# Patient Record
Sex: FEMALE | Race: WHITE | NOT HISPANIC OR LATINO | Employment: UNEMPLOYED | URBAN - NONMETROPOLITAN AREA
[De-identification: names, ages, dates, MRNs, and addresses within clinical notes are randomized per-mention and may not be internally consistent; named-entity substitution may affect disease eponyms.]

---

## 2017-01-03 ENCOUNTER — APPOINTMENT (OUTPATIENT)
Dept: PHYSICAL THERAPY | Facility: CLINIC | Age: 22
End: 2017-01-03
Payer: COMMERCIAL

## 2017-01-03 PROCEDURE — 97112 NEUROMUSCULAR REEDUCATION: CPT

## 2017-01-03 PROCEDURE — 97116 GAIT TRAINING THERAPY: CPT

## 2017-01-03 PROCEDURE — 97110 THERAPEUTIC EXERCISES: CPT

## 2017-01-03 PROCEDURE — 97140 MANUAL THERAPY 1/> REGIONS: CPT

## 2017-01-10 ENCOUNTER — APPOINTMENT (OUTPATIENT)
Dept: PHYSICAL THERAPY | Facility: CLINIC | Age: 22
End: 2017-01-10
Payer: COMMERCIAL

## 2017-01-10 PROCEDURE — 97116 GAIT TRAINING THERAPY: CPT

## 2017-01-10 PROCEDURE — 97140 MANUAL THERAPY 1/> REGIONS: CPT

## 2017-01-10 PROCEDURE — 97110 THERAPEUTIC EXERCISES: CPT

## 2017-01-11 ENCOUNTER — ALLSCRIPTS OFFICE VISIT (OUTPATIENT)
Dept: OTHER | Facility: OTHER | Age: 22
End: 2017-01-11

## 2017-01-11 DIAGNOSIS — M25.572 PAIN IN LEFT ANKLE: ICD-10-CM

## 2017-01-11 DIAGNOSIS — R63.4 ABNORMAL WEIGHT LOSS: ICD-10-CM

## 2017-01-11 DIAGNOSIS — D64.9 ANEMIA: ICD-10-CM

## 2017-01-11 DIAGNOSIS — D50.9 IRON DEFICIENCY ANEMIA: ICD-10-CM

## 2017-01-11 DIAGNOSIS — M25.473 EFFUSION OF ANKLE: ICD-10-CM

## 2017-01-11 DIAGNOSIS — R26.2 DIFFICULTY IN WALKING, NOT ELSEWHERE CLASSIFIED: ICD-10-CM

## 2017-01-11 DIAGNOSIS — M06.9 RHEUMATOID ARTHRITIS (HCC): ICD-10-CM

## 2017-01-16 ENCOUNTER — HOSPITAL ENCOUNTER (OUTPATIENT)
Dept: RADIOLOGY | Facility: HOSPITAL | Age: 22
Discharge: HOME | End: 2017-01-16
Payer: COMMERCIAL

## 2017-01-16 ENCOUNTER — TRANSCRIBE ORDERS (OUTPATIENT)
Dept: ADMINISTRATIVE | Facility: HOSPITAL | Age: 22
End: 2017-01-16

## 2017-01-16 DIAGNOSIS — M25.572 PAIN IN LEFT ANKLE: ICD-10-CM

## 2017-01-16 DIAGNOSIS — R26.2 DIFFICULTY IN WALKING, NOT ELSEWHERE CLASSIFIED: ICD-10-CM

## 2017-01-16 DIAGNOSIS — M06.9 RHEUMATOID ARTHRITIS (HCC): ICD-10-CM

## 2017-01-16 DIAGNOSIS — M25.473 EFFUSION OF ANKLE: ICD-10-CM

## 2017-01-16 PROCEDURE — 73610 X-RAY EXAM OF ANKLE: CPT

## 2017-01-17 ENCOUNTER — GENERIC CONVERSION - ENCOUNTER (OUTPATIENT)
Dept: OTHER | Facility: OTHER | Age: 22
End: 2017-01-17

## 2017-01-18 ENCOUNTER — GENERIC CONVERSION - ENCOUNTER (OUTPATIENT)
Dept: OTHER | Facility: OTHER | Age: 22
End: 2017-01-18

## 2017-01-25 ENCOUNTER — GENERIC CONVERSION - ENCOUNTER (OUTPATIENT)
Dept: OTHER | Facility: OTHER | Age: 22
End: 2017-01-25

## 2017-01-26 ENCOUNTER — APPOINTMENT (OUTPATIENT)
Dept: PHYSICAL THERAPY | Facility: CLINIC | Age: 22
End: 2017-01-26
Payer: COMMERCIAL

## 2017-01-31 ENCOUNTER — APPOINTMENT (OUTPATIENT)
Dept: PHYSICAL THERAPY | Facility: CLINIC | Age: 22
End: 2017-01-31
Payer: COMMERCIAL

## 2017-01-31 PROCEDURE — 97140 MANUAL THERAPY 1/> REGIONS: CPT

## 2017-01-31 PROCEDURE — 97110 THERAPEUTIC EXERCISES: CPT

## 2017-01-31 PROCEDURE — 97112 NEUROMUSCULAR REEDUCATION: CPT

## 2017-02-02 ENCOUNTER — APPOINTMENT (OUTPATIENT)
Dept: PHYSICAL THERAPY | Facility: CLINIC | Age: 22
End: 2017-02-02
Payer: COMMERCIAL

## 2017-02-02 PROCEDURE — 97140 MANUAL THERAPY 1/> REGIONS: CPT

## 2017-02-02 PROCEDURE — 97112 NEUROMUSCULAR REEDUCATION: CPT

## 2017-02-06 ENCOUNTER — APPOINTMENT (OUTPATIENT)
Dept: PHYSICAL THERAPY | Facility: CLINIC | Age: 22
End: 2017-02-06
Payer: COMMERCIAL

## 2017-02-06 PROCEDURE — 97112 NEUROMUSCULAR REEDUCATION: CPT

## 2017-02-06 PROCEDURE — 97116 GAIT TRAINING THERAPY: CPT

## 2017-02-06 PROCEDURE — 97110 THERAPEUTIC EXERCISES: CPT

## 2017-02-06 PROCEDURE — 97140 MANUAL THERAPY 1/> REGIONS: CPT

## 2017-02-07 ENCOUNTER — APPOINTMENT (OUTPATIENT)
Dept: PHYSICAL THERAPY | Facility: CLINIC | Age: 22
End: 2017-02-07
Payer: COMMERCIAL

## 2017-02-13 ENCOUNTER — APPOINTMENT (OUTPATIENT)
Dept: PHYSICAL THERAPY | Facility: CLINIC | Age: 22
End: 2017-02-13
Payer: COMMERCIAL

## 2017-02-13 ENCOUNTER — GENERIC CONVERSION - ENCOUNTER (OUTPATIENT)
Dept: OTHER | Facility: OTHER | Age: 22
End: 2017-02-13

## 2017-02-13 PROCEDURE — 97140 MANUAL THERAPY 1/> REGIONS: CPT

## 2017-02-13 PROCEDURE — 97112 NEUROMUSCULAR REEDUCATION: CPT

## 2017-02-13 PROCEDURE — 97116 GAIT TRAINING THERAPY: CPT

## 2017-02-14 ENCOUNTER — TRANSCRIBE ORDERS (OUTPATIENT)
Dept: URGENT CARE | Facility: CLINIC | Age: 22
End: 2017-02-14

## 2017-02-14 ENCOUNTER — LAB (OUTPATIENT)
Dept: LAB | Facility: CLINIC | Age: 22
End: 2017-02-14
Payer: COMMERCIAL

## 2017-02-14 ENCOUNTER — APPOINTMENT (OUTPATIENT)
Dept: PHYSICAL THERAPY | Facility: CLINIC | Age: 22
End: 2017-02-14
Payer: COMMERCIAL

## 2017-02-14 DIAGNOSIS — D64.9 ANEMIA, UNSPECIFIED: ICD-10-CM

## 2017-02-14 DIAGNOSIS — R63.4 LOSS OF WEIGHT: ICD-10-CM

## 2017-02-14 DIAGNOSIS — M06.9 RHEUMATOID ARTHRITIS, INVOLVING UNSPECIFIED SITE, UNSPECIFIED RHEUMATOID FACTOR PRESENCE: ICD-10-CM

## 2017-02-14 DIAGNOSIS — M25.476 EFFUSION OF ANKLE AND FOOT JOINT: ICD-10-CM

## 2017-02-14 DIAGNOSIS — R26.2 CANNOT WALK: ICD-10-CM

## 2017-02-14 DIAGNOSIS — R63.4 LOSS OF WEIGHT: Primary | ICD-10-CM

## 2017-02-14 DIAGNOSIS — M25.473 EFFUSION OF ANKLE AND FOOT JOINT: ICD-10-CM

## 2017-02-14 LAB
ALBUMIN SERPL BCP-MCNC: 2.7 G/DL (ref 3.5–5)
ALP SERPL-CCNC: 241 U/L (ref 46–116)
ALT SERPL W P-5'-P-CCNC: 34 U/L (ref 12–78)
ANION GAP SERPL CALCULATED.3IONS-SCNC: 10 MMOL/L (ref 4–13)
AST SERPL W P-5'-P-CCNC: 18 U/L (ref 5–45)
BASOPHILS # BLD AUTO: 0.02 THOUSANDS/ΜL (ref 0–0.1)
BASOPHILS NFR BLD AUTO: 0 % (ref 0–1)
BILIRUB SERPL-MCNC: 0.5 MG/DL (ref 0.2–1)
BUN SERPL-MCNC: 8 MG/DL (ref 5–25)
CALCIUM SERPL-MCNC: 9.2 MG/DL (ref 8.3–10.1)
CHLORIDE SERPL-SCNC: 105 MMOL/L (ref 100–108)
CO2 SERPL-SCNC: 24 MMOL/L (ref 21–32)
CREAT SERPL-MCNC: 0.47 MG/DL (ref 0.6–1.3)
EOSINOPHIL # BLD AUTO: 0.05 THOUSAND/ΜL (ref 0–0.61)
EOSINOPHIL NFR BLD AUTO: 1 % (ref 0–6)
ERYTHROCYTE [DISTWIDTH] IN BLOOD BY AUTOMATED COUNT: 17.8 % (ref 11.6–15.1)
ERYTHROCYTE [SEDIMENTATION RATE] IN BLOOD: 82 MM/HOUR (ref 0–20)
FERRITIN SERPL-MCNC: 24 NG/ML (ref 8–388)
GFR SERPL CREATININE-BSD FRML MDRD: >60 ML/MIN/1.73SQ M
GLUCOSE SERPL-MCNC: 83 MG/DL (ref 65–140)
HCT VFR BLD AUTO: 33.8 % (ref 34.8–46.1)
HGB BLD-MCNC: 9.7 G/DL (ref 11.5–15.4)
LYMPHOCYTES # BLD AUTO: 2.26 THOUSANDS/ΜL (ref 0.6–4.47)
LYMPHOCYTES NFR BLD AUTO: 38 % (ref 14–44)
MCH RBC QN AUTO: 19.8 PG (ref 26.8–34.3)
MCHC RBC AUTO-ENTMCNC: 28.7 G/DL (ref 31.4–37.4)
MCV RBC AUTO: 69 FL (ref 82–98)
MONOCYTES # BLD AUTO: 0.47 THOUSAND/ΜL (ref 0.17–1.22)
MONOCYTES NFR BLD AUTO: 8 % (ref 4–12)
NEUTROPHILS # BLD AUTO: 3.18 THOUSANDS/ΜL (ref 1.85–7.62)
NEUTS SEG NFR BLD AUTO: 53 % (ref 43–75)
NRBC BLD AUTO-RTO: 0 /100 WBCS
PLATELET # BLD AUTO: 796 THOUSANDS/UL (ref 149–390)
PMV BLD AUTO: 8.6 FL (ref 8.9–12.7)
POTASSIUM SERPL-SCNC: 3.9 MMOL/L (ref 3.5–5.3)
PROT SERPL-MCNC: 8.1 G/DL (ref 6.4–8.2)
RBC # BLD AUTO: 4.89 MILLION/UL (ref 3.81–5.12)
SODIUM SERPL-SCNC: 139 MMOL/L (ref 136–145)
TIBC SERPL-MCNC: 329 UG/DL (ref 250–450)
TSH SERPL DL<=0.05 MIU/L-ACNC: 1.67 UIU/ML (ref 0.36–3.74)
WBC # BLD AUTO: 5.99 THOUSAND/UL (ref 4.31–10.16)

## 2017-02-14 PROCEDURE — 83540 ASSAY OF IRON: CPT

## 2017-02-14 PROCEDURE — 82728 ASSAY OF FERRITIN: CPT

## 2017-02-14 PROCEDURE — 83550 IRON BINDING TEST: CPT

## 2017-02-14 PROCEDURE — 84443 ASSAY THYROID STIM HORMONE: CPT

## 2017-02-14 PROCEDURE — 80053 COMPREHEN METABOLIC PANEL: CPT

## 2017-02-14 PROCEDURE — 36415 COLL VENOUS BLD VENIPUNCTURE: CPT

## 2017-02-14 PROCEDURE — 85025 COMPLETE CBC W/AUTO DIFF WBC: CPT

## 2017-02-14 PROCEDURE — 85652 RBC SED RATE AUTOMATED: CPT

## 2017-02-15 DIAGNOSIS — D63.8 ANEMIA IN OTHER CHRONIC DISEASES CLASSIFIED ELSEWHERE: ICD-10-CM

## 2017-02-15 DIAGNOSIS — D50.9 IRON DEFICIENCY ANEMIA: ICD-10-CM

## 2017-02-15 DIAGNOSIS — D64.9 ANEMIA: ICD-10-CM

## 2017-02-15 LAB — IRON SERPL-MCNC: 20 UG/DL (ref 50–170)

## 2017-02-16 ENCOUNTER — APPOINTMENT (OUTPATIENT)
Dept: PHYSICAL THERAPY | Facility: CLINIC | Age: 22
End: 2017-02-16
Payer: COMMERCIAL

## 2017-02-16 PROCEDURE — 97140 MANUAL THERAPY 1/> REGIONS: CPT

## 2017-02-16 PROCEDURE — 97112 NEUROMUSCULAR REEDUCATION: CPT

## 2017-02-16 PROCEDURE — 97116 GAIT TRAINING THERAPY: CPT

## 2017-02-20 ENCOUNTER — APPOINTMENT (OUTPATIENT)
Dept: PHYSICAL THERAPY | Facility: CLINIC | Age: 22
End: 2017-02-20
Payer: COMMERCIAL

## 2017-02-20 PROCEDURE — 97140 MANUAL THERAPY 1/> REGIONS: CPT

## 2017-02-20 PROCEDURE — 97116 GAIT TRAINING THERAPY: CPT

## 2017-02-20 PROCEDURE — 97112 NEUROMUSCULAR REEDUCATION: CPT

## 2017-02-22 ENCOUNTER — APPOINTMENT (OUTPATIENT)
Dept: PHYSICAL THERAPY | Facility: CLINIC | Age: 22
End: 2017-02-22
Payer: COMMERCIAL

## 2017-02-22 ENCOUNTER — GENERIC CONVERSION - ENCOUNTER (OUTPATIENT)
Dept: OTHER | Facility: OTHER | Age: 22
End: 2017-02-22

## 2017-02-23 ENCOUNTER — APPOINTMENT (OUTPATIENT)
Dept: PHYSICAL THERAPY | Facility: CLINIC | Age: 22
End: 2017-02-23
Payer: COMMERCIAL

## 2017-02-23 PROCEDURE — 97116 GAIT TRAINING THERAPY: CPT

## 2017-02-23 PROCEDURE — 97140 MANUAL THERAPY 1/> REGIONS: CPT

## 2017-02-23 PROCEDURE — 97112 NEUROMUSCULAR REEDUCATION: CPT

## 2017-02-24 ENCOUNTER — GENERIC CONVERSION - ENCOUNTER (OUTPATIENT)
Dept: OTHER | Facility: OTHER | Age: 22
End: 2017-02-24

## 2017-02-27 ENCOUNTER — APPOINTMENT (OUTPATIENT)
Dept: PHYSICAL THERAPY | Facility: CLINIC | Age: 22
End: 2017-02-27
Payer: COMMERCIAL

## 2017-02-27 PROCEDURE — 97760 ORTHOTIC MGMT&TRAING 1ST ENC: CPT

## 2017-02-27 PROCEDURE — 97140 MANUAL THERAPY 1/> REGIONS: CPT

## 2017-03-01 ENCOUNTER — APPOINTMENT (OUTPATIENT)
Dept: PHYSICAL THERAPY | Facility: CLINIC | Age: 22
End: 2017-03-01
Payer: COMMERCIAL

## 2017-03-01 RX ORDER — SODIUM CHLORIDE 9 MG/ML
20 INJECTION, SOLUTION INTRAVENOUS CONTINUOUS
Status: DISPENSED | OUTPATIENT
Start: 2017-03-02 | End: 2017-03-02

## 2017-03-02 ENCOUNTER — HOSPITAL ENCOUNTER (OUTPATIENT)
Dept: INFUSION CENTER | Facility: HOSPITAL | Age: 22
Discharge: HOME | End: 2017-03-02
Payer: COMMERCIAL

## 2017-03-02 VITALS
RESPIRATION RATE: 18 BRPM | SYSTOLIC BLOOD PRESSURE: 119 MMHG | TEMPERATURE: 99.7 F | DIASTOLIC BLOOD PRESSURE: 65 MMHG | HEART RATE: 85 BPM

## 2017-03-02 PROCEDURE — 96365 THER/PROPH/DIAG IV INF INIT: CPT

## 2017-03-02 PROCEDURE — 96366 THER/PROPH/DIAG IV INF ADDON: CPT

## 2017-03-02 RX ADMIN — IRON SUCROSE 300 MG: 20 INJECTION, SOLUTION INTRAVENOUS at 12:08

## 2017-03-02 RX ADMIN — SODIUM CHLORIDE 20 ML/HR: 0.9 INJECTION, SOLUTION INTRAVENOUS at 12:05

## 2017-03-02 NOTE — PROGRESS NOTES
Pt tolerated venofer well  Observed x 30 minutes post infusion  Temp 99 7  Pt discharged to home in satisfactory condition via wheelchair accompanied by her mother  Next appointment 3/16/17 as patient will be away next week

## 2017-03-02 NOTE — PROGRESS NOTES
Pt arrived on unit at 1145  Temp 100 5  Denies any coughing, sore throat, fever urinary frequency or urgency  Mother stated she has a rash on both her legs  Pt has 1/4 inch size reddened areas on both lower legs up to her knees  Rash on left legs has some areas that have a white raised center from patient scratching  Mother states rash is from lotion they put onher legs and that the rash is resolving  Frederick Bland RN notified of temp and rash  Okay to proceed with venofer

## 2017-03-02 NOTE — PLAN OF CARE
Problem: Knowledge Deficit  Goal: Patient/family/caregiver demonstrates understanding of disease process, treatment plan, medications, and discharge instructions  Complete learning assessment and assess knowledge base  Interventions:  - Provide teaching at level of understanding  - Provide teaching via preferred learning methods   Outcome: Progressing  Mother is patient's caregiver  Side effects for Venofer reviewed with mother

## 2017-03-02 NOTE — PLAN OF CARE
Problem: Potential for Falls  Goal: Patient will remain free of falls  INTERVENTIONS:  - Assess patient frequently for physical needs  - Identify cognitive and physical deficits and behaviors that affect risk of falls    - [place] fall precautions as indicated by assessment   - Educate patient/family on patient safety including physical limitations  - Instruct patient to call for assistance with activity based on assessment  - Modify environment to reduce risk of injury  - Consider OT/PT consult to assist with strengthening/mobility   Outcome: Progressing

## 2017-03-02 NOTE — PLAN OF CARE
Problem: Nutrition/Hydration-ADULT  Goal: Nutrient/Hydration intake appropriate for improving, restoring or maintaining nutritional needs  Monitor and assess patients nutrition/hydration status for malnutrition (ex- brittle hair, bruises, dry skin, pale skin and conjunctiva, muscle wasting, smooth red tongue, and disorientation)  Collaborate with interdisciplinary team and initiate plan and interventions as ordered  Monitor patients weight and dietary intake as ordered or per policy  Utilize nutrition screening tool and intervene per policy  Determine patients food preferences and provide high-protein, high-caloric foods as appropriate       INTERVENTIONS:  - Monitor oral intake, urinary output, labs, and treatment plans  - Assess nutrition and hydration status and recommend course of action  - Evaluate amount of meals eaten  - Assist patient with eating if necessary   - Allow adequate time for meals  - Recommend/ encourage appropriate diets, oral nutritional supplements, and vitamin/mineral supplements  - Order, calculate, and assess calorie counts as needed  - Recommend, monitor, and adjust tube feedings and TPN/PPN based on assessed needs  - Assess need for intravenous fluids  - Provide specific nutrition/hydration education as appropriate  - Include patient/family/caregiver in decisions related to nutrition   Outcome: Progressing

## 2017-03-06 ENCOUNTER — APPOINTMENT (OUTPATIENT)
Dept: PHYSICAL THERAPY | Facility: CLINIC | Age: 22
End: 2017-03-06
Payer: COMMERCIAL

## 2017-03-06 PROCEDURE — 97116 GAIT TRAINING THERAPY: CPT

## 2017-03-06 PROCEDURE — 97140 MANUAL THERAPY 1/> REGIONS: CPT

## 2017-03-06 PROCEDURE — 97110 THERAPEUTIC EXERCISES: CPT

## 2017-03-10 ENCOUNTER — GENERIC CONVERSION - ENCOUNTER (OUTPATIENT)
Dept: OTHER | Facility: OTHER | Age: 22
End: 2017-03-10

## 2017-03-13 ENCOUNTER — APPOINTMENT (OUTPATIENT)
Dept: PHYSICAL THERAPY | Facility: CLINIC | Age: 22
End: 2017-03-13
Payer: COMMERCIAL

## 2017-03-15 RX ORDER — SODIUM CHLORIDE 9 MG/ML
20 INJECTION, SOLUTION INTRAVENOUS CONTINUOUS
Status: DISCONTINUED | OUTPATIENT
Start: 2017-03-16 | End: 2017-03-16

## 2017-03-16 ENCOUNTER — APPOINTMENT (OUTPATIENT)
Dept: PHYSICAL THERAPY | Facility: CLINIC | Age: 22
End: 2017-03-16
Payer: COMMERCIAL

## 2017-03-16 ENCOUNTER — HOSPITAL ENCOUNTER (OUTPATIENT)
Dept: INFUSION CENTER | Facility: HOSPITAL | Age: 22
Discharge: HOME | End: 2017-03-16
Payer: COMMERCIAL

## 2017-03-20 ENCOUNTER — ALLSCRIPTS OFFICE VISIT (OUTPATIENT)
Dept: OTHER | Facility: OTHER | Age: 22
End: 2017-03-20

## 2017-03-20 DIAGNOSIS — R06.02 SHORTNESS OF BREATH: ICD-10-CM

## 2017-03-20 DIAGNOSIS — R53.82 CHRONIC FATIGUE: ICD-10-CM

## 2017-03-21 ENCOUNTER — APPOINTMENT (OUTPATIENT)
Dept: PHYSICAL THERAPY | Facility: CLINIC | Age: 22
End: 2017-03-21
Payer: COMMERCIAL

## 2017-03-21 PROCEDURE — 97140 MANUAL THERAPY 1/> REGIONS: CPT

## 2017-03-21 PROCEDURE — 97116 GAIT TRAINING THERAPY: CPT

## 2017-03-21 PROCEDURE — 97112 NEUROMUSCULAR REEDUCATION: CPT

## 2017-03-22 RX ORDER — SODIUM CHLORIDE 9 MG/ML
20 INJECTION, SOLUTION INTRAVENOUS CONTINUOUS
Status: DISPENSED | OUTPATIENT
Start: 2017-03-23 | End: 2017-03-23

## 2017-03-23 ENCOUNTER — HOSPITAL ENCOUNTER (OUTPATIENT)
Dept: INFUSION CENTER | Facility: HOSPITAL | Age: 22
Discharge: HOME | End: 2017-03-23
Payer: COMMERCIAL

## 2017-03-23 VITALS
RESPIRATION RATE: 18 BRPM | SYSTOLIC BLOOD PRESSURE: 113 MMHG | DIASTOLIC BLOOD PRESSURE: 65 MMHG | HEART RATE: 96 BPM | TEMPERATURE: 99.2 F

## 2017-03-23 PROCEDURE — 96365 THER/PROPH/DIAG IV INF INIT: CPT

## 2017-03-23 RX ADMIN — IRON SUCROSE 300 MG: 20 INJECTION, SOLUTION INTRAVENOUS at 14:30

## 2017-03-23 RX ADMIN — SODIUM CHLORIDE 20 ML/HR: 0.9 INJECTION, SOLUTION INTRAVENOUS at 14:15

## 2017-03-23 NOTE — PROGRESS NOTES
Pt tolerated treatment well  Discharged to home in satisfactory condition via wheelchair accompanied by mother  Next appointment 3/30/17

## 2017-03-23 NOTE — PROGRESS NOTES
Pt arrived on unit at 1310  Mother states she noticed a big difference in her daughters energy level after her last Venofer treatment  IV attempted x 2 by Blake Burks RN were unsuccessful  IV started on 3 rd attempt

## 2017-03-29 RX ORDER — SODIUM CHLORIDE 9 MG/ML
20 INJECTION, SOLUTION INTRAVENOUS CONTINUOUS
Status: DISCONTINUED | OUTPATIENT
Start: 2017-03-30 | End: 2017-03-30

## 2017-03-30 ENCOUNTER — HOSPITAL ENCOUNTER (OUTPATIENT)
Dept: INFUSION CENTER | Facility: HOSPITAL | Age: 22
Discharge: HOME | End: 2017-03-30
Payer: COMMERCIAL

## 2017-03-31 ENCOUNTER — HOSPITAL ENCOUNTER (OUTPATIENT)
Dept: NON INVASIVE DIAGNOSTICS | Facility: HOSPITAL | Age: 22
Discharge: HOME | End: 2017-03-31
Attending: INTERNAL MEDICINE
Payer: COMMERCIAL

## 2017-03-31 DIAGNOSIS — R60.9 EDEMA: ICD-10-CM

## 2017-03-31 PROCEDURE — 93306 TTE W/DOPPLER COMPLETE: CPT

## 2017-04-03 ENCOUNTER — TRANSCRIBE ORDERS (OUTPATIENT)
Dept: ADMINISTRATIVE | Facility: HOSPITAL | Age: 22
End: 2017-04-03

## 2017-04-03 ENCOUNTER — HOSPITAL ENCOUNTER (OUTPATIENT)
Dept: RADIOLOGY | Facility: HOSPITAL | Age: 22
Discharge: HOME | End: 2017-04-03
Attending: INTERNAL MEDICINE
Payer: COMMERCIAL

## 2017-04-03 DIAGNOSIS — R06.02 SHORTNESS OF BREATH: ICD-10-CM

## 2017-04-03 PROCEDURE — 71020 HB CHEST X-RAY 2VW FRONTAL&LATL: CPT

## 2017-04-11 ENCOUNTER — HOSPITAL ENCOUNTER (OUTPATIENT)
Dept: INFUSION CENTER | Facility: HOSPITAL | Age: 22
Discharge: HOME | End: 2017-04-11
Payer: COMMERCIAL

## 2017-04-11 VITALS
SYSTOLIC BLOOD PRESSURE: 103 MMHG | RESPIRATION RATE: 18 BRPM | TEMPERATURE: 99.6 F | DIASTOLIC BLOOD PRESSURE: 56 MMHG | HEART RATE: 80 BPM

## 2017-04-11 PROCEDURE — 96365 THER/PROPH/DIAG IV INF INIT: CPT

## 2017-04-11 PROCEDURE — 96366 THER/PROPH/DIAG IV INF ADDON: CPT

## 2017-04-11 RX ORDER — SODIUM CHLORIDE 9 MG/ML
20 INJECTION, SOLUTION INTRAVENOUS CONTINUOUS
Status: DISPENSED | OUTPATIENT
Start: 2017-04-11 | End: 2017-04-11

## 2017-04-11 RX ADMIN — IRON SUCROSE 300 MG: 20 INJECTION, SOLUTION INTRAVENOUS at 13:37

## 2017-04-11 RX ADMIN — SODIUM CHLORIDE 20 ML/HR: 0.9 INJECTION, SOLUTION INTRAVENOUS at 13:20

## 2017-04-11 NOTE — PLAN OF CARE
Problem: Potential for Falls  Goal: Patient will remain free of falls  INTERVENTIONS:  - Assess patient frequently for physical needs  - Identify cognitive and physical deficits and behaviors that affect risk of falls    - [place] fall precautions as indicated by assessment   - Educate patient/family on patient safety including physical limitations  - Instruct patient to call for assistance with activity based on assessment  - Modify environment to reduce risk of injury  - Consider OT/PT consult to assist with strengthening/mobility   Outcome: Progressing

## 2017-04-11 NOTE — PROGRESS NOTES
Pt tolerated treatment well  D/C to home accompanied by mother in satisfactory condition  No further appointments needed at this time

## 2017-04-17 ENCOUNTER — APPOINTMENT (OUTPATIENT)
Dept: PHYSICAL THERAPY | Facility: CLINIC | Age: 22
End: 2017-04-17
Payer: COMMERCIAL

## 2017-04-17 PROCEDURE — 97112 NEUROMUSCULAR REEDUCATION: CPT

## 2017-04-17 PROCEDURE — 97140 MANUAL THERAPY 1/> REGIONS: CPT

## 2017-04-17 PROCEDURE — 97116 GAIT TRAINING THERAPY: CPT

## 2017-04-18 ENCOUNTER — ALLSCRIPTS OFFICE VISIT (OUTPATIENT)
Dept: OTHER | Facility: OTHER | Age: 22
End: 2017-04-18

## 2017-04-20 ENCOUNTER — APPOINTMENT (OUTPATIENT)
Dept: PHYSICAL THERAPY | Facility: CLINIC | Age: 22
End: 2017-04-20
Payer: COMMERCIAL

## 2017-04-20 PROCEDURE — 97112 NEUROMUSCULAR REEDUCATION: CPT

## 2017-04-20 PROCEDURE — 97140 MANUAL THERAPY 1/> REGIONS: CPT

## 2017-04-20 PROCEDURE — 97110 THERAPEUTIC EXERCISES: CPT

## 2017-04-24 ENCOUNTER — ALLSCRIPTS OFFICE VISIT (OUTPATIENT)
Dept: OTHER | Facility: OTHER | Age: 22
End: 2017-04-24

## 2017-04-25 ENCOUNTER — APPOINTMENT (OUTPATIENT)
Dept: PHYSICAL THERAPY | Facility: CLINIC | Age: 22
End: 2017-04-25
Payer: COMMERCIAL

## 2017-04-25 PROCEDURE — 97116 GAIT TRAINING THERAPY: CPT

## 2017-04-25 PROCEDURE — 97140 MANUAL THERAPY 1/> REGIONS: CPT

## 2017-04-25 PROCEDURE — 97112 NEUROMUSCULAR REEDUCATION: CPT

## 2017-05-01 ENCOUNTER — ALLSCRIPTS OFFICE VISIT (OUTPATIENT)
Dept: OTHER | Facility: OTHER | Age: 22
End: 2017-05-01

## 2017-05-04 ENCOUNTER — APPOINTMENT (OUTPATIENT)
Dept: PHYSICAL THERAPY | Facility: CLINIC | Age: 22
End: 2017-05-04
Payer: COMMERCIAL

## 2017-05-04 PROCEDURE — 97110 THERAPEUTIC EXERCISES: CPT

## 2017-05-04 PROCEDURE — 97116 GAIT TRAINING THERAPY: CPT

## 2017-05-04 PROCEDURE — 97112 NEUROMUSCULAR REEDUCATION: CPT

## 2017-05-04 PROCEDURE — 97140 MANUAL THERAPY 1/> REGIONS: CPT

## 2017-05-08 ENCOUNTER — APPOINTMENT (OUTPATIENT)
Dept: PHYSICAL THERAPY | Facility: CLINIC | Age: 22
End: 2017-05-08
Payer: COMMERCIAL

## 2017-05-08 PROCEDURE — 97110 THERAPEUTIC EXERCISES: CPT

## 2017-05-08 PROCEDURE — 97140 MANUAL THERAPY 1/> REGIONS: CPT

## 2017-05-08 PROCEDURE — 97112 NEUROMUSCULAR REEDUCATION: CPT

## 2017-05-09 ENCOUNTER — TRANSCRIBE ORDERS (OUTPATIENT)
Dept: LAB | Facility: CLINIC | Age: 22
End: 2017-05-09

## 2017-05-09 ENCOUNTER — TRANSCRIBE ORDERS (OUTPATIENT)
Dept: URGENT CARE | Facility: CLINIC | Age: 22
End: 2017-05-09

## 2017-05-09 ENCOUNTER — APPOINTMENT (OUTPATIENT)
Dept: LAB | Facility: CLINIC | Age: 22
End: 2017-05-09
Payer: COMMERCIAL

## 2017-05-09 DIAGNOSIS — D64.9 ANEMIA, UNSPECIFIED: ICD-10-CM

## 2017-05-09 DIAGNOSIS — M06.9 RHEUMATOID ARTHRITIS, INVOLVING UNSPECIFIED SITE, UNSPECIFIED RHEUMATOID FACTOR PRESENCE: ICD-10-CM

## 2017-05-09 DIAGNOSIS — M13.0 UNSPECIFIED POLYARTHROPATHY OR POLYARTHRITIS, SITE UNSPECIFIED: ICD-10-CM

## 2017-05-09 DIAGNOSIS — R80.9 PROTEINURIA, UNSPECIFIED TYPE: Primary | ICD-10-CM

## 2017-05-09 DIAGNOSIS — Z51.81 ENCOUNTER FOR THERAPEUTIC DRUG MONITORING: ICD-10-CM

## 2017-05-09 DIAGNOSIS — D50.9 IRON DEFICIENCY ANEMIA, UNSPECIFIED: ICD-10-CM

## 2017-05-09 DIAGNOSIS — M25.473 EFFUSION OF ANKLE AND FOOT JOINT: ICD-10-CM

## 2017-05-09 DIAGNOSIS — M25.476 EFFUSION OF ANKLE AND FOOT JOINT: ICD-10-CM

## 2017-05-09 DIAGNOSIS — R26.2 CANNOT WALK: ICD-10-CM

## 2017-05-09 DIAGNOSIS — R26.2 CANNOT WALK: Primary | ICD-10-CM

## 2017-05-09 DIAGNOSIS — R80.9 PROTEINURIA, UNSPECIFIED TYPE: ICD-10-CM

## 2017-05-09 LAB
ALBUMIN SERPL BCP-MCNC: 3.6 G/DL (ref 3.5–5)
ALP SERPL-CCNC: 118 U/L (ref 46–116)
ALT SERPL W P-5'-P-CCNC: 33 U/L (ref 12–78)
ANION GAP SERPL CALCULATED.3IONS-SCNC: 7 MMOL/L (ref 4–13)
AST SERPL W P-5'-P-CCNC: 6 U/L (ref 5–45)
BASOPHILS # BLD AUTO: 0.03 THOUSANDS/ΜL (ref 0–0.1)
BASOPHILS NFR BLD AUTO: 0 % (ref 0–1)
BILIRUB SERPL-MCNC: 0.27 MG/DL (ref 0.2–1)
BILIRUB UR QL STRIP: NEGATIVE
BUN SERPL-MCNC: 12 MG/DL (ref 5–25)
CALCIUM SERPL-MCNC: 8.6 MG/DL (ref 8.3–10.1)
CHLORIDE SERPL-SCNC: 108 MMOL/L (ref 100–108)
CLARITY UR: NORMAL
CO2 SERPL-SCNC: 25 MMOL/L (ref 21–32)
COLOR UR: YELLOW
CREAT SERPL-MCNC: 0.61 MG/DL (ref 0.6–1.3)
CREAT UR-MCNC: 101 MG/DL
EOSINOPHIL # BLD AUTO: 0.01 THOUSAND/ΜL (ref 0–0.61)
EOSINOPHIL NFR BLD AUTO: 0 % (ref 0–6)
ERYTHROCYTE [DISTWIDTH] IN BLOOD BY AUTOMATED COUNT: 22.5 % (ref 11.6–15.1)
ERYTHROCYTE [SEDIMENTATION RATE] IN BLOOD: 20 MM/HOUR (ref 0–20)
FERRITIN SERPL-MCNC: 68 NG/ML (ref 8–388)
GFR SERPL CREATININE-BSD FRML MDRD: >60 ML/MIN/1.73SQ M
GLUCOSE P FAST SERPL-MCNC: 122 MG/DL (ref 65–99)
GLUCOSE UR STRIP-MCNC: NEGATIVE MG/DL
HCT VFR BLD AUTO: 48.3 % (ref 34.8–46.1)
HGB BLD-MCNC: 14.9 G/DL (ref 11.5–15.4)
HGB UR QL STRIP.AUTO: NEGATIVE
IGA SERPL-MCNC: 159 MG/DL (ref 70–400)
IGG SERPL-MCNC: 832 MG/DL (ref 700–1600)
IGM SERPL-MCNC: 165 MG/DL (ref 40–230)
KETONES UR STRIP-MCNC: NEGATIVE MG/DL
LEUKOCYTE ESTERASE UR QL STRIP: NEGATIVE
LYMPHOCYTES # BLD AUTO: 2 THOUSANDS/ΜL (ref 0.6–4.47)
LYMPHOCYTES NFR BLD AUTO: 16 % (ref 14–44)
MCH RBC QN AUTO: 25 PG (ref 26.8–34.3)
MCHC RBC AUTO-ENTMCNC: 30.8 G/DL (ref 31.4–37.4)
MCV RBC AUTO: 81 FL (ref 82–98)
MONOCYTES # BLD AUTO: 0.25 THOUSAND/ΜL (ref 0.17–1.22)
MONOCYTES NFR BLD AUTO: 2 % (ref 4–12)
NEUTROPHILS # BLD AUTO: 9.93 THOUSANDS/ΜL (ref 1.85–7.62)
NEUTS SEG NFR BLD AUTO: 82 % (ref 43–75)
NITRITE UR QL STRIP: NEGATIVE
NRBC BLD AUTO-RTO: 0 /100 WBCS
PH UR STRIP.AUTO: 8 [PH] (ref 4.5–8)
PLATELET # BLD AUTO: 216 THOUSANDS/UL (ref 149–390)
PMV BLD AUTO: 9.3 FL (ref 8.9–12.7)
POTASSIUM SERPL-SCNC: 4 MMOL/L (ref 3.5–5.3)
PROT SERPL-MCNC: 6.9 G/DL (ref 6.4–8.2)
PROT UR STRIP-MCNC: NEGATIVE MG/DL
PROT UR-MCNC: 24 MG/DL
PROT/CREAT UR: 0.24 MG/G{CREAT} (ref 0–0.1)
RBC # BLD AUTO: 5.97 MILLION/UL (ref 3.81–5.12)
SODIUM SERPL-SCNC: 140 MMOL/L (ref 136–145)
SP GR UR STRIP.AUTO: 1.02 (ref 1–1.03)
TSH SERPL DL<=0.05 MIU/L-ACNC: 0.92 UIU/ML (ref 0.36–3.74)
UROBILINOGEN UR QL STRIP.AUTO: 1 E.U./DL
WBC # BLD AUTO: 12.28 THOUSAND/UL (ref 4.31–10.16)

## 2017-05-09 PROCEDURE — 86480 TB TEST CELL IMMUN MEASURE: CPT

## 2017-05-09 PROCEDURE — 85652 RBC SED RATE AUTOMATED: CPT

## 2017-05-09 PROCEDURE — 82728 ASSAY OF FERRITIN: CPT

## 2017-05-09 PROCEDURE — 84443 ASSAY THYROID STIM HORMONE: CPT

## 2017-05-09 PROCEDURE — 85025 COMPLETE CBC W/AUTO DIFF WBC: CPT

## 2017-05-09 PROCEDURE — 82570 ASSAY OF URINE CREATININE: CPT

## 2017-05-09 PROCEDURE — 81003 URINALYSIS AUTO W/O SCOPE: CPT

## 2017-05-09 PROCEDURE — 82784 ASSAY IGA/IGD/IGG/IGM EACH: CPT

## 2017-05-09 PROCEDURE — 84156 ASSAY OF PROTEIN URINE: CPT

## 2017-05-09 PROCEDURE — 80053 COMPREHEN METABOLIC PANEL: CPT

## 2017-05-09 PROCEDURE — 36415 COLL VENOUS BLD VENIPUNCTURE: CPT

## 2017-05-11 ENCOUNTER — GENERIC CONVERSION - ENCOUNTER (OUTPATIENT)
Dept: OTHER | Facility: OTHER | Age: 22
End: 2017-05-11

## 2017-05-11 LAB
ANNOTATION COMMENT IMP: NORMAL
GAMMA INTERFERON BACKGROUND BLD IA-ACNC: 0.04 IU/ML
M TB IFN-G BLD-IMP: NEGATIVE
M TB IFN-G CD4+ BCKGRND COR BLD-ACNC: <0.01 IU/ML
M TB IFN-G CD4+ T-CELLS BLD-ACNC: 0.03 IU/ML
MITOGEN IGNF BLD-ACNC: 6.21 IU/ML
QUANTIFERON-TB GOLD IN TUBE: NORMAL
SERVICE CMNT-IMP: NORMAL

## 2017-05-18 ENCOUNTER — GENERIC CONVERSION - ENCOUNTER (OUTPATIENT)
Dept: OTHER | Facility: OTHER | Age: 22
End: 2017-05-18

## 2017-06-06 ENCOUNTER — APPOINTMENT (OUTPATIENT)
Dept: PHYSICAL THERAPY | Facility: CLINIC | Age: 22
End: 2017-06-06
Payer: COMMERCIAL

## 2017-06-06 PROCEDURE — 97116 GAIT TRAINING THERAPY: CPT

## 2017-06-06 PROCEDURE — 97110 THERAPEUTIC EXERCISES: CPT

## 2017-06-06 PROCEDURE — 97112 NEUROMUSCULAR REEDUCATION: CPT

## 2017-06-06 PROCEDURE — 97140 MANUAL THERAPY 1/> REGIONS: CPT

## 2017-06-08 ENCOUNTER — APPOINTMENT (OUTPATIENT)
Dept: PHYSICAL THERAPY | Facility: CLINIC | Age: 22
End: 2017-06-08
Payer: COMMERCIAL

## 2017-06-08 PROCEDURE — 97112 NEUROMUSCULAR REEDUCATION: CPT

## 2017-06-08 PROCEDURE — 97140 MANUAL THERAPY 1/> REGIONS: CPT

## 2017-06-08 PROCEDURE — 97110 THERAPEUTIC EXERCISES: CPT

## 2017-06-16 ENCOUNTER — ALLSCRIPTS OFFICE VISIT (OUTPATIENT)
Dept: OTHER | Facility: OTHER | Age: 22
End: 2017-06-16

## 2017-06-20 ENCOUNTER — APPOINTMENT (OUTPATIENT)
Dept: PHYSICAL THERAPY | Facility: CLINIC | Age: 22
End: 2017-06-20
Payer: COMMERCIAL

## 2017-06-20 PROCEDURE — 97110 THERAPEUTIC EXERCISES: CPT

## 2017-06-20 PROCEDURE — 97140 MANUAL THERAPY 1/> REGIONS: CPT

## 2017-06-20 PROCEDURE — 97112 NEUROMUSCULAR REEDUCATION: CPT

## 2017-06-21 ENCOUNTER — GENERIC CONVERSION - ENCOUNTER (OUTPATIENT)
Dept: OTHER | Facility: OTHER | Age: 22
End: 2017-06-21

## 2017-06-22 ENCOUNTER — APPOINTMENT (OUTPATIENT)
Dept: PHYSICAL THERAPY | Facility: CLINIC | Age: 22
End: 2017-06-22
Payer: COMMERCIAL

## 2017-06-22 PROCEDURE — 97110 THERAPEUTIC EXERCISES: CPT

## 2017-06-22 PROCEDURE — 97116 GAIT TRAINING THERAPY: CPT

## 2017-06-22 PROCEDURE — 97140 MANUAL THERAPY 1/> REGIONS: CPT

## 2017-06-22 PROCEDURE — 97112 NEUROMUSCULAR REEDUCATION: CPT

## 2017-07-06 ENCOUNTER — APPOINTMENT (OUTPATIENT)
Dept: PHYSICAL THERAPY | Facility: CLINIC | Age: 22
End: 2017-07-06
Payer: COMMERCIAL

## 2017-07-06 PROCEDURE — 97140 MANUAL THERAPY 1/> REGIONS: CPT

## 2017-07-06 PROCEDURE — 97116 GAIT TRAINING THERAPY: CPT

## 2017-07-06 PROCEDURE — 97110 THERAPEUTIC EXERCISES: CPT

## 2017-07-13 ENCOUNTER — GENERIC CONVERSION - ENCOUNTER (OUTPATIENT)
Dept: OTHER | Facility: OTHER | Age: 22
End: 2017-07-13

## 2017-07-27 ENCOUNTER — ALLSCRIPTS OFFICE VISIT (OUTPATIENT)
Dept: OTHER | Facility: OTHER | Age: 22
End: 2017-07-27

## 2017-08-10 ENCOUNTER — APPOINTMENT (OUTPATIENT)
Dept: PHYSICAL THERAPY | Facility: CLINIC | Age: 22
End: 2017-08-10
Payer: COMMERCIAL

## 2017-08-10 PROCEDURE — 97164 PT RE-EVAL EST PLAN CARE: CPT

## 2017-08-10 PROCEDURE — 97116 GAIT TRAINING THERAPY: CPT

## 2017-08-10 PROCEDURE — 97112 NEUROMUSCULAR REEDUCATION: CPT

## 2017-08-14 ENCOUNTER — GENERIC CONVERSION - ENCOUNTER (OUTPATIENT)
Dept: OTHER | Facility: OTHER | Age: 22
End: 2017-08-14

## 2017-08-15 ENCOUNTER — APPOINTMENT (OUTPATIENT)
Dept: PHYSICAL THERAPY | Facility: CLINIC | Age: 22
End: 2017-08-15
Payer: COMMERCIAL

## 2017-08-15 PROCEDURE — 97110 THERAPEUTIC EXERCISES: CPT

## 2017-08-15 PROCEDURE — 97140 MANUAL THERAPY 1/> REGIONS: CPT

## 2017-08-15 PROCEDURE — 97112 NEUROMUSCULAR REEDUCATION: CPT

## 2017-08-17 ENCOUNTER — APPOINTMENT (OUTPATIENT)
Dept: PHYSICAL THERAPY | Facility: CLINIC | Age: 22
End: 2017-08-17
Payer: COMMERCIAL

## 2017-08-29 ENCOUNTER — APPOINTMENT (OUTPATIENT)
Dept: PHYSICAL THERAPY | Facility: CLINIC | Age: 22
End: 2017-08-29
Payer: COMMERCIAL

## 2017-08-29 PROCEDURE — 97112 NEUROMUSCULAR REEDUCATION: CPT

## 2017-08-29 PROCEDURE — 97110 THERAPEUTIC EXERCISES: CPT

## 2017-08-29 PROCEDURE — 97140 MANUAL THERAPY 1/> REGIONS: CPT

## 2017-09-05 ENCOUNTER — APPOINTMENT (OUTPATIENT)
Dept: PHYSICAL THERAPY | Facility: CLINIC | Age: 22
End: 2017-09-05
Payer: COMMERCIAL

## 2017-09-05 PROCEDURE — 97116 GAIT TRAINING THERAPY: CPT

## 2017-09-05 PROCEDURE — 97112 NEUROMUSCULAR REEDUCATION: CPT

## 2017-09-05 PROCEDURE — 97140 MANUAL THERAPY 1/> REGIONS: CPT

## 2017-09-07 ENCOUNTER — APPOINTMENT (OUTPATIENT)
Dept: PHYSICAL THERAPY | Facility: CLINIC | Age: 22
End: 2017-09-07
Payer: COMMERCIAL

## 2017-09-12 ENCOUNTER — APPOINTMENT (OUTPATIENT)
Dept: PHYSICAL THERAPY | Facility: CLINIC | Age: 22
End: 2017-09-12
Payer: COMMERCIAL

## 2017-09-12 PROCEDURE — 97112 NEUROMUSCULAR REEDUCATION: CPT

## 2017-09-12 PROCEDURE — 97110 THERAPEUTIC EXERCISES: CPT

## 2017-09-12 PROCEDURE — 97140 MANUAL THERAPY 1/> REGIONS: CPT

## 2017-09-14 ENCOUNTER — APPOINTMENT (OUTPATIENT)
Dept: PHYSICAL THERAPY | Facility: CLINIC | Age: 22
End: 2017-09-14
Payer: COMMERCIAL

## 2017-09-19 ENCOUNTER — APPOINTMENT (OUTPATIENT)
Dept: PHYSICAL THERAPY | Facility: CLINIC | Age: 22
End: 2017-09-19
Payer: COMMERCIAL

## 2017-09-21 ENCOUNTER — APPOINTMENT (OUTPATIENT)
Dept: PHYSICAL THERAPY | Facility: CLINIC | Age: 22
End: 2017-09-21
Payer: COMMERCIAL

## 2017-09-21 PROCEDURE — 97112 NEUROMUSCULAR REEDUCATION: CPT

## 2017-09-21 PROCEDURE — 97140 MANUAL THERAPY 1/> REGIONS: CPT

## 2017-09-21 PROCEDURE — 97110 THERAPEUTIC EXERCISES: CPT

## 2017-09-26 ENCOUNTER — APPOINTMENT (OUTPATIENT)
Dept: PHYSICAL THERAPY | Facility: CLINIC | Age: 22
End: 2017-09-26
Payer: COMMERCIAL

## 2017-09-26 PROCEDURE — 97140 MANUAL THERAPY 1/> REGIONS: CPT

## 2017-09-26 PROCEDURE — 97110 THERAPEUTIC EXERCISES: CPT

## 2017-09-26 PROCEDURE — 97112 NEUROMUSCULAR REEDUCATION: CPT

## 2017-09-28 ENCOUNTER — APPOINTMENT (OUTPATIENT)
Dept: PHYSICAL THERAPY | Facility: CLINIC | Age: 22
End: 2017-09-28
Payer: COMMERCIAL

## 2017-10-03 ENCOUNTER — APPOINTMENT (OUTPATIENT)
Dept: PHYSICAL THERAPY | Facility: CLINIC | Age: 22
End: 2017-10-03
Payer: COMMERCIAL

## 2017-10-03 PROCEDURE — 97110 THERAPEUTIC EXERCISES: CPT

## 2017-10-03 PROCEDURE — 97112 NEUROMUSCULAR REEDUCATION: CPT

## 2017-10-03 PROCEDURE — 97140 MANUAL THERAPY 1/> REGIONS: CPT

## 2017-10-05 ENCOUNTER — APPOINTMENT (OUTPATIENT)
Dept: PHYSICAL THERAPY | Facility: CLINIC | Age: 22
End: 2017-10-05
Payer: COMMERCIAL

## 2017-10-05 PROCEDURE — 97140 MANUAL THERAPY 1/> REGIONS: CPT

## 2017-10-05 PROCEDURE — 97110 THERAPEUTIC EXERCISES: CPT

## 2017-10-05 PROCEDURE — 97112 NEUROMUSCULAR REEDUCATION: CPT

## 2017-10-09 ENCOUNTER — GENERIC CONVERSION - ENCOUNTER (OUTPATIENT)
Dept: OTHER | Facility: OTHER | Age: 22
End: 2017-10-09

## 2017-10-25 ENCOUNTER — APPOINTMENT (OUTPATIENT)
Dept: PHYSICAL THERAPY | Facility: CLINIC | Age: 22
End: 2017-10-25
Payer: COMMERCIAL

## 2017-11-07 ENCOUNTER — APPOINTMENT (OUTPATIENT)
Dept: PHYSICAL THERAPY | Facility: CLINIC | Age: 22
End: 2017-11-07
Payer: COMMERCIAL

## 2017-11-09 ENCOUNTER — GENERIC CONVERSION - ENCOUNTER (OUTPATIENT)
Dept: OTHER | Facility: OTHER | Age: 22
End: 2017-11-09

## 2017-11-14 ENCOUNTER — APPOINTMENT (OUTPATIENT)
Dept: PHYSICAL THERAPY | Facility: CLINIC | Age: 22
End: 2017-11-14
Payer: COMMERCIAL

## 2017-11-14 PROCEDURE — 97140 MANUAL THERAPY 1/> REGIONS: CPT

## 2017-11-14 PROCEDURE — 97112 NEUROMUSCULAR REEDUCATION: CPT

## 2017-11-14 PROCEDURE — 97110 THERAPEUTIC EXERCISES: CPT

## 2017-11-15 ENCOUNTER — GENERIC CONVERSION - ENCOUNTER (OUTPATIENT)
Dept: OTHER | Facility: OTHER | Age: 22
End: 2017-11-15

## 2017-11-17 ENCOUNTER — GENERIC CONVERSION - ENCOUNTER (OUTPATIENT)
Dept: OTHER | Facility: OTHER | Age: 22
End: 2017-11-17

## 2017-11-28 ENCOUNTER — APPOINTMENT (OUTPATIENT)
Dept: PHYSICAL THERAPY | Facility: CLINIC | Age: 22
End: 2017-11-28
Payer: COMMERCIAL

## 2017-12-05 ENCOUNTER — APPOINTMENT (OUTPATIENT)
Dept: PHYSICAL THERAPY | Facility: CLINIC | Age: 22
End: 2017-12-05
Payer: COMMERCIAL

## 2017-12-05 PROCEDURE — 97140 MANUAL THERAPY 1/> REGIONS: CPT

## 2017-12-05 PROCEDURE — 97110 THERAPEUTIC EXERCISES: CPT

## 2017-12-07 ENCOUNTER — APPOINTMENT (OUTPATIENT)
Dept: PHYSICAL THERAPY | Facility: CLINIC | Age: 22
End: 2017-12-07
Payer: COMMERCIAL

## 2017-12-07 PROCEDURE — 97140 MANUAL THERAPY 1/> REGIONS: CPT

## 2017-12-07 PROCEDURE — 97110 THERAPEUTIC EXERCISES: CPT

## 2017-12-12 ENCOUNTER — APPOINTMENT (OUTPATIENT)
Dept: PHYSICAL THERAPY | Facility: CLINIC | Age: 22
End: 2017-12-12
Payer: COMMERCIAL

## 2017-12-12 PROCEDURE — 97112 NEUROMUSCULAR REEDUCATION: CPT

## 2017-12-12 PROCEDURE — 97110 THERAPEUTIC EXERCISES: CPT

## 2017-12-12 PROCEDURE — 97140 MANUAL THERAPY 1/> REGIONS: CPT

## 2017-12-14 ENCOUNTER — GENERIC CONVERSION - ENCOUNTER (OUTPATIENT)
Dept: OTHER | Facility: OTHER | Age: 22
End: 2017-12-14

## 2017-12-14 ENCOUNTER — APPOINTMENT (OUTPATIENT)
Dept: PHYSICAL THERAPY | Facility: CLINIC | Age: 22
End: 2017-12-14
Payer: COMMERCIAL

## 2017-12-14 PROCEDURE — 97140 MANUAL THERAPY 1/> REGIONS: CPT

## 2017-12-14 PROCEDURE — G8979 MOBILITY GOAL STATUS: HCPCS

## 2017-12-14 PROCEDURE — 97112 NEUROMUSCULAR REEDUCATION: CPT

## 2017-12-14 PROCEDURE — 97110 THERAPEUTIC EXERCISES: CPT

## 2017-12-14 PROCEDURE — G8980 MOBILITY D/C STATUS: HCPCS

## 2017-12-28 ENCOUNTER — GENERIC CONVERSION - ENCOUNTER (OUTPATIENT)
Dept: FAMILY MEDICINE CLINIC | Facility: CLINIC | Age: 22
End: 2017-12-28

## 2018-01-10 NOTE — RESULT NOTES
Verified Results  FL UPPER GI / SMALL BOWEL WITH AIR [id number] 07:44AM Juan Jose Rivera Order Number: [id number]     Test Name Result Flag Reference   FL UPPER GI / SMALL BOWEL WITH AIR (Report)     UPPER GI AND SMALL BOWEL FOLLOW THROUGH SINGLE CONTRAST     INDICATION: Episodes of vomiting after eating  COMPARISON:  None     IMAGES: 26     FLUOROSCOPY TIME: 1 3 minutes     TECHNIQUE:   view of the abdomen was obtained and exhibits mild lumbar levoscoliosis  Spina bifida occulta of S1 noted  Upper GI was performed utilizing barium orally to the patient  Subsequently, a small bowel follow through was performed including spot images of the terminal ileum  FINDINGS:     The esophagus is normal in caliber  Esophageal motility is normal and emptying of contrast from the esophagus is prompt  No mucosal abnormalities although evaluation is limited with single contrast technique  The stomach is unremarkable in size  No gross gastric mucosal abnormalities although evaluation limited with single contrast technique  No penetrating ulcers or masses  Contrast empties promptly into the duodenum  The duodenum is normal in caliber  The ligament of Treitz/duodenojejunal junction lies in a normal position  Gastroesophageal reflux was not observed  Small hiatal hernia  Normal caliber small bowel loops  There is a normal fold pattern and thickness  Dense contrast obscures fine mucosal detail  No intraluminal filling defects, bowel kinking or fistula  The terminal ileum appears normal  The appendix is partially opacified  IMPRESSION:     Small hiatal hernia  Otherwise, normal upper GI and small bowel series  Workstation performed: [id number]     Signed by:   Chantelle Sherwood MD   5/19/16   710

## 2018-01-11 NOTE — MISCELLANEOUS
Message   Recorded as Task   Date: 02/24/2017 01:39 PM, Created By: Colleen Morris   Task Name: Care Coordination   Assigned To: Felecia Decker   Regarding Patient: Aminata Grace, Status: Active   Comment:    Lizzie Aguirre - 24 Feb 2017 1:39 PM     TASK CREATED  Caller: Jolene-mother; Care Coordination; [phone]  Pt's mom is calling to see what the next step is for her daughter  She just had bloodwork done and her PCP says that her iron is still low and she should start infusions  Does she need to make an office appointment with Dr Brennan Sheldon first, or can infusion be scheduled first? 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)   7  Autism (299 00) (F84 0)   8  Bilateral swelling of feet and ankles (719 07) (M25 473,M25 476)   9  Chromosomal imbalance syndrome, 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, deletion, 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)   10  Chronic pain of left ankle (719 47,338 29) (M25 572,G89 29)   11  Esophageal reflux (530 81) (K21 9)   12  Generalized anxiety disorder (300 02) (F41 1)   13  Hip dysplasia (755 63) (Q65 89)   14  Influenza vaccine needed (V04 81) (Z23)   15  Iron deficiency anemia (280 9) (D50 9)   16  Lymphedema (457 1) (I89 0)   17  Peripheral edema (782 3) (R60 9)   18  Ptosis of both eyelids (374 30) (H02 403)   19  Rheumatoid arthritis (714 0) (M06 9)   20  Scoliosis (737 30) (M41 9)   21  Screening for depression (V79 0) (Z13 89)   22  Swelling of multiple joints (719 09) (M25 40)   23  Tremor (781 0) (R25 1)    Current Meds   1   Paxil 10 MG/5ML Oral Suspension (PARoxetine HCl); TAKE 10 ML ONCE A DAY; Therapy: 25BKZ5692 to (Evaluate:23Apr2017)  Requested for: 24RTQ4812; Last   Rx:05Oct2016 Ordered   2  Seasonale TABS (Levonorgest-Eth Estrad 91-Day); Take 1 tablet daily Recorded    Allergies    1  No Known Drug Allergies    Plan  Anemia    · (1) CBC/PLT/DIFF; Status:Active - Retrospective By Protocol Authorization; Requested  for:46Oxc7511;    · (Q) IRON, TIBC AND FERRITIN PANEL; Status:Active - Retrospective By Protocol  Authorization;  Requested for:47Mmo6469;     Signatures   Electronically signed by : Cesia Knowles, ; Feb 24 2017  3:43PM EST                       (Author)

## 2018-01-11 NOTE — RESULT NOTES
Verified Results  (1) FERRITIN [id number] 01:55PM Janette Glass     Test Name Result Flag Reference   FERRITIN 68 ng/mL  8-388     (1) TSH 36FZS5128 01:55PM Janette Glass     Test Name Result Flag Reference   TSH 0 921 uIU/mL  0 358-3 740   This bloodwork is non-fasting  Please drink two glasses of water morning of  bloodwork  Patients undergoing fluorescein dye angiography may retain small amounts of fluorescein in the body for 48-72 hours post procedure  Samples containing fluorescein can produce falsely depressed TSH values  If the patient had this procedure,a specimen should be resubmitted post fluorescein clearance            The recommended reference ranges for TSH during pregnancy are as follows:  First trimester 0 1 to 2 5 uIU/mL  Second trimester  0 2 to 3 0 uIU/mL  Third trimester 0 3 to 3 0 uIU/m

## 2018-01-11 NOTE — RESULT NOTES
Verified Results  (1) CALPROTECTIN, FECAL 09Jun2016 05:49PM Yeyo Mention Order Number: [id number]     Test Name Result Flag Reference   CALPROTECTIN, FECAL 20 ug/g  0 - 120   Performed at:  [address]  [id number]  : Nica Headley MD, Phone:  [phone]

## 2018-01-12 VITALS
WEIGHT: 134 LBS | OXYGEN SATURATION: 98 % | TEMPERATURE: 98.4 F | SYSTOLIC BLOOD PRESSURE: 114 MMHG | RESPIRATION RATE: 18 BRPM | HEART RATE: 108 BPM | BODY MASS INDEX: 22.88 KG/M2 | DIASTOLIC BLOOD PRESSURE: 80 MMHG | HEIGHT: 64 IN

## 2018-01-12 NOTE — MISCELLANEOUS
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)   5  Autism (299 00) (F84 0)   6  Bilateral swelling of feet and ankles (719 07) (M25 473,M25 476)   7  Generalized anxiety disorder (300 02) (F41 1)   8  Hip dysplasia (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 63) (Q65 89)   9  Lymphedema (457 1) (I89 0)   10  Nausea (787 02) (R11 0)   11  Peripheral edema (782 3) (R60 9)   12  Ptosis of both eyelids (374 30) (H02 403)   13  Rheumatoid arthritis (714 0) (M06 9)   14  Scoliosis (737 30) (M41 9)    Current Meds   1  Hydroxychloroquine Sulfate 200 MG Oral Tablet; Take 1 tablet twice daily; Therapy: (Recorded:12Eje5083) to Recorded   2  Melatonin 200 MCG Oral Tablet; Therapy: (Recorded:03Mar2016) to Recorded   3  Paxil 10 MG/5ML Oral Suspension (PARoxetine HCl); TAKE 5 ML ONCE A DAY; Therapy: 45YQE9939 to (Evaluate:54Ogj5481)  Requested for: 20Ogd0706; Last   Rx:93Scz4977 Ordered   4  Seasonale 0 15-0 03 MG TABS (Levonorgest-Eth Estrad 91-Day); Therapy: (Recorded:03Mar2016) to Recorded   5  Vitamin D3 90975 UNIT Oral Capsule; TAKE 1 CAPSULE Weekly; Therapy: (Recorded:06Avc7684) to Recorded    Allergies    1  No Known Drug Allergies    Signatures   Electronically signed by :  Keira Kimble, ; Aug  3 2016 10:01AM EST                       (Author)

## 2018-01-12 NOTE — RESULT NOTES
Verified Results  * XR ANKLE 3+ VIEW LEFT [id number] 01:33PM Valeta Sly Order Number: [id number]     Test Name Result Flag Reference   XR ANKLE 3+ VW LEFT (Report)     RIGHT ANKLE     INDICATION: Effusions  RA  Limited range of motion  COMPARISON: None     VIEWS: AP, lateral and oblique ; 3 images     FINDINGS:     There is no acute fracture or dislocation  No degenerative changes  No lytic or blastic lesions are seen  Bimalleolar soft tissue swelling noted  IMPRESSION:     No acute osseous abnormality  Soft tissue swelling  LEFT ANKLE     INDICATION: Effusions  RA  Limited range of motion  COMPARISON: None     VIEWS: AP, lateral and oblique ; 3 images     FINDINGS:     There is no acute fracture or dislocation  No degenerative changes  No lytic or blastic lesions are seen  Bimalleolar soft tissue swelling noted  IMPRESSION:     No acute osseous abnormality  Soft tissue swelling  Workstation performed: [id number]     Signed by:   Kelly Pringle MD   1/16/17     * XR ANKLE 3+ VIEW RIGHT 69UPH2923 01:33PM [employer] Order Number: [id number]     Test Name Result Flag Reference   XR ANKLE 3+ VW RIGHT (Report)     RIGHT ANKLE     INDICATION: Effusions  RA  Limited range of motion  COMPARISON: None     VIEWS: AP, lateral and oblique ; 3 images     FINDINGS:     There is no acute fracture or dislocation  No degenerative changes  No lytic or blastic lesions are seen  Bimalleolar soft tissue swelling noted  IMPRESSION:     No acute osseous abnormality  Soft tissue swelling  LEFT ANKLE     INDICATION: Effusions  RA  Limited range of motion  COMPARISON: None     VIEWS: AP, lateral and oblique ; 3 images     FINDINGS:     There is no acute fracture or dislocation  No degenerative changes  No lytic or blastic lesions are seen  Bimalleolar soft tissue swelling noted  IMPRESSION:     No acute osseous abnormality  Soft tissue swelling  Workstation performed: [id number]     Signed by:   Kelly Pringle MD   1/16/17

## 2018-01-13 VITALS
HEIGHT: 64 IN | SYSTOLIC BLOOD PRESSURE: 120 MMHG | HEART RATE: 108 BPM | DIASTOLIC BLOOD PRESSURE: 60 MMHG | BODY MASS INDEX: 19.12 KG/M2 | WEIGHT: 112 LBS

## 2018-01-13 VITALS
OXYGEN SATURATION: 99 % | SYSTOLIC BLOOD PRESSURE: 110 MMHG | HEART RATE: 92 BPM | DIASTOLIC BLOOD PRESSURE: 68 MMHG | TEMPERATURE: 96.8 F | HEIGHT: 64 IN | WEIGHT: 116 LBS | BODY MASS INDEX: 19.81 KG/M2 | RESPIRATION RATE: 17 BRPM

## 2018-01-13 VITALS
WEIGHT: 116 LBS | DIASTOLIC BLOOD PRESSURE: 65 MMHG | SYSTOLIC BLOOD PRESSURE: 112 MMHG | HEART RATE: 87 BPM | OXYGEN SATURATION: 99 % | TEMPERATURE: 98.7 F | BODY MASS INDEX: 19.91 KG/M2

## 2018-01-13 VITALS
SYSTOLIC BLOOD PRESSURE: 120 MMHG | HEART RATE: 88 BPM | TEMPERATURE: 98.4 F | HEIGHT: 64 IN | BODY MASS INDEX: 21.72 KG/M2 | DIASTOLIC BLOOD PRESSURE: 72 MMHG | OXYGEN SATURATION: 96 % | RESPIRATION RATE: 18 BRPM | WEIGHT: 127.25 LBS

## 2018-01-14 VITALS
HEART RATE: 86 BPM | RESPIRATION RATE: 17 BRPM | TEMPERATURE: 98.4 F | SYSTOLIC BLOOD PRESSURE: 98 MMHG | OXYGEN SATURATION: 99 % | BODY MASS INDEX: 19.63 KG/M2 | DIASTOLIC BLOOD PRESSURE: 60 MMHG | HEIGHT: 64 IN | WEIGHT: 115 LBS

## 2018-01-15 VITALS
OXYGEN SATURATION: 98 % | HEIGHT: 64 IN | DIASTOLIC BLOOD PRESSURE: 60 MMHG | WEIGHT: 111 LBS | TEMPERATURE: 98.4 F | BODY MASS INDEX: 18.95 KG/M2 | SYSTOLIC BLOOD PRESSURE: 100 MMHG | HEART RATE: 105 BPM

## 2018-01-15 NOTE — RESULT NOTES
Verified Results  (1) TISSUE EXAM 02OQE4256 12:11PM Francisca Butler     Test Name Result Flag Reference   LAB AP CASE REPORT (Report)     Surgical Pathology Report             Case: [id number]                   Authorizing Provider: Chiquita Herring DO    Collected:      10/03/2016 1211        Ordering Location:   [address] [place] [address]   Received:      10/04/2016 [address] [facility]                               Pathologist:      Marci Lima MD                             Specimens:  A) - Duodenum                                             B) - Stomach, gastric body   LAB AP FINAL DIAGNOSIS (Report)     A  Duodenum, biopsy:  - Benign duodenal mucosa with focal prominent benign intramucosal   lymphoid aggregate   - No villous atrophy, intraepithelial lymphocytosis or crypt hyperplasia   to suggest malabsorptive enteropathy   - No chronic or active duodenitis  - No glandular dysplasia and no evidence of malignancy  B  Stomach, body, biopsy:  - Minimally active chronic oxyntic and antral gastritis, negative for   Helicobacter pylori, confirmed by immunohistochemical stain, evaluated   with appropriate positive controls  - No atrophy or intestinal metaplasia identified   - No epithelial dysplasia and no evidence of malignancy  Electronically signed by Marci Lima MD on 10/5/2016 at 4:11 PM   LAB AP SURGICAL ADDITIONAL INFORMATION (Report)     These tests were developed and their performance characteristics   determined by [facility] or [facility]  They may not be cleared or approved by the [place]  Food and   Drug Administration  The FDA has determined that such clearance or   approval is not necessary  These tests are used for clinical purposes  They should not be regarded as investigational or for research   This   laboratory has been approved by CLIA 88, designated as a high-complexity   laboratory and is qualified to perform these tests   Interpretation performed at [facility]   LAB AP GROSS DESCRIPTION (Report)     A  The specimen is received in formalin, labeled with the patient's name   and hospital number, and is designated duodenum, are multiple   irregularly shaped fragments of tan soft tissue measuring 0 7 x 0 4 x 0 1   cm in aggregate  Entirely submitted  One cassette  B  The specimen is received in formalin, labeled with the patient's name   and hospital number, and is designated gastric body, are multiple   irregularly shaped fragments of tan soft tissue measuring 0 6 x 0 3 x 0 1   cm in aggregate  Entirely submitted  One cassette  Note: The estimated total formalin fixation time based upon information   provided by the submitting clinician and the standard processing schedule   is 25 75 hours        RLR   LAB AP CLINICAL INFORMATION      Abnormal mucosa, flattened villi, R/o celiac   Abnormal mucosa, flattened villi, R/o celiac

## 2018-01-18 NOTE — MISCELLANEOUS
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, Tremor; Ordered By: Bart Vega Performed:  Due: 50BJC0599  Care Summary provided  : Yes  Anemia    · Ferrous Sulfate 220 (44 Fe) MG/5ML Oral Elixir; TAKE 15 ML DAILY   Rx By: Bart Vega; Dispense: 32 Days ; #:1 X 473 ML Bottle; Refill: 3; For: Anemia; DESIRAE = N; Verified Transmission to [facility] #8997 Last Updated By: System, SureScripts; 10/5/2016 3:35:37 PM   · 1 - Aye Cm DO  (Medical Oncology) Physician Referral  Consult  Status: Active   Requested for: 25NXJ8326   Ordered; For: Anemia; Ordered By: Bart Vega Performed:  Due: 42TBH2926  Care Summary provided  : Yes  Esophageal reflux    · Omeprazole 40 MG Oral Capsule Delayed Release; TAKE 1 CAPSULE Daily   Rx By: Bart Vega; Dispense: 30 Days ; #:30 Capsule Delayed Release; Refill: 5; For: Esophageal reflux; DESIRAE = N; Verified Transmission to [facility] #1306 Last Updated By: System, SureScripts; 10/5/2016 3:37:19 PM  Generalized anxiety disorder    · Paxil 10 MG/5ML Oral Suspension (PARoxetine HCl); TAKE 10 ML ONCE A DAY   Rx By: Bart Vega; Dispense: 100 Days ; #:4 X 250 ML Bottle; Refill: 1; For: Generalized anxiety disorder; DESIRAE = N; Verified Transmission to [facility]); Last Updated By: System, SureScripts; 10/5/2016 3:35:41 PM  Nausea    · Ondansetron 4 MG Oral Tablet Dispersible;  One pill every 8 hours as needed for  nausea   Rx By: Tres Loaiza; Dispense: 0 Days ; #:90 Tablet Dispersible; Refill: 1; For: Nausea; DESIRAE = N; Verified Transmission to [facility]/PHARMACY #0425 Last Updated By: System, SureScripts; 10/5/2016 3:37:05 PM  PMH: Anxiety    · From  LORazepam 1 MG Oral Tablet TAKE 0 5 TABLET Daily PRN To  LORazepam 0 5 MG Oral Tablet TAKE 1 TABLET 3 times daily PRN   Rx By: Tres Medinas; Dispense: 90 Days ; #:90 Tablet; Refill: 1; For: PMH: Anxiety; DESIRAE = N; Print Rx    Discussion/Summary  Discussion Summary:   Reviewed the hospital records with her and her mother at length  Discussed with her that the biopsy results are not final yet from the EGD  Discussed with her that celiac disease is a definite possibility with some of her symptoms as well as her persistent anemia  Discussed with them further evaluation of the anemia with hematology especially since this is persistent and may be multifactorial  This is especially important for follow-up given the fact that medications are relatively hard for her to take and also since 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  We'll refer her to hematology for further evaluation  Follow-up with neurology was recommended after discharge  Referral will be given with regard to this  Discussed with her mother that some of her tremors may be related to her underlying chromosomal abnormality but may also be related to her anxiety symptoms  Discussed follow-up with psychiatry  Her mother agrees to do this but wants to have the neurology evaluation as well as a hematology evaluation first  Discussed follow-up with GI regarding the EGD  They are planning to set this up but they are unsure regarding the timing of this  Continue to follow-up with rheumatology as previously  Her mother was asking about a referral to endocrine because of the frequency of 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   Referral will be given with regard to this but discussed with them that the other referrals are more important at this point  Prescription was given for the Paxil to an increase it to 20 mg on a daily basis which is equivalent to 2 teaspoons daily  This prescription was sent to their mail order since local pharmacies do not have this  Prescription was given for the lorazepam to use as needed as noted above  Prescription for the omeprazole was also given  Mother was requesting a prescription for medication for nausea  Prescription for the Zofran ODT was given  Prescription was also given for the iron  Discussed with them that this may be difficult to obtain in liquid form  Hopefully hematology will give some insight with regard to this  We'll have her follow-up in about 2-3 months or sooner if needed  We'll hold on laboratory testing at this point until ordered with specialists  With her phobia of laboratory testing, will try to limit this is much as possible  Counseling Documentation With Imm: The patient, patient's family was counseled regarding diagnostic results, instructions for management, prognosis, patient and family education, risks and benefits of treatment options, importance of compliance with treatment  total time of encounter was 50 minutes and 30 minutes was spent counseling  Chief Complaint  Chief Complaint Free Text Note Form: PT here for FAIZAN from [facility]  Pt's mother is requesting med changes for Paxil, something for her daughters nausea and wants to discuss her anemia  History of Present Illness  TCM Communication [facility]: The patient is being contacted for follow-up after hospitalization  She was hospitalized at [facility]  The date of admission: 09/29/2016, date of discharge: 10/03/2016  Diagnosis: nAUSEA/vOMITING  She was discharged to home  Communication performed and completed by   HPI: She presents for follow-up after recent hospitalization   Her mother had taken her to the hospital because she had had significant issues which were persistent with nausea and vomiting and her mother had begun to notice tremors and unusual movements of her extremities  She has had the nausea and vomiting over the last 6 months or so and this has been associated with some significant weight loss  She had been followed by GI and had some preliminary laboratory testing done  Mother had noted that she was basically vomiting almost after every meal  She had difficulty eating basically anything and keeping it down  Even was having trouble with fluids at times  Mother was concerned because she seemed to be much more tired and began to have tremors and spasming at times of her hands  She was admitted to the hospital and given IV hydration and was evaluated by GI as well as neurology and psychiatry  Undergoing EGD and the final biopsy results were pending at the time of this visit  She was being evaluated for possible celiac disease  She has had chronic anemia over the last year or so  No definite cause was found for this  Laboratory testing during the hospitalization showed a hemoglobin of 9 6 and low iron studies  She was evaluated by neurology as well as psychiatry and some of her symptoms were felt to be due to her anxiety symptoms  She had been on Paxil liquid and this dosage was increased during her hospitalization to 20 mg of the liquid on a daily basis  Her mother has not yet started this dosage  She was also given Ativan during the hospital stay to calm her for IVs as well as blood draws  This seemed to help her significantly and her mother was asking for a prescription for this to have for an as-needed basis  She was given a prescription for short-term for this after the hospitalization  Mother has noted that the nausea and the vomiting seemed to worsen if she is allowed to become active or allowed to get up and move after eating  Her mother has started to have her sit for about an hour after eating and this seems to help her keep some of the food down   Continues to follow-up with rheumatology on a regular basis  Her medications including the hydroxychloroquine have remained stable  Joint swelling has improved  She has been continued on the compression pumps for her legs which has decreased some of the lymphedema  She was discharged from the hospital 2 days ago and has been relatively stable since that point  She was found to have significantly low albumen when hospitalized  Mother is trying to encourage her to eat on a regular basis  This has been relatively difficult with her nausea and vomiting symptoms as well as her anxiety  Patient denies any abdominal pain  She denies any current nausea  Bowel movements have remained relatively stable  Review of Systems  Complete-Female:   Constitutional: feeling tired, but as noted in HPI, no fever and no chills  Cardiovascular: lower extremity edema, but as noted in HPI  Respiratory: as noted in HPI and no shortness of breath  Gastrointestinal: vomiting, but as noted in HPI  Genitourinary: no dysuria  Musculoskeletal: arthralgias and joint stiffness  Neurological: as noted in HPI  Psychiatric: anxiety, but as noted in HPI  Hematologic/Lymphatic: as noted in HPI and no tendency for easy bleeding  ROS Reviewed:   ROS reviewed  Active Problems    1  Abdominal discomfort (789 00) (R10 9)   2  Abnormal weight loss (783 21) (R63 4)   3  Anemia (285 9) (D64 9)   4  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)   5  Autism (299 00) (F84 0)   6  Bilateral swelling of feet and ankles (719 07) (M25 473,M25 476)   7  Generalized anxiety disorder (300 02) (F41 1)   8  Hip dysplasia (755 63) (Q65 89)   9  Lymphedema (457 1) (I89 0)   10  Nausea (787 02) (R11 0)   11  Peripheral edema (782 3) (R60 9)   12  Ptosis of both eyelids (374 30) (H02 403)   13  Rheumatoid arthritis (714 0) (M06 9)   14  Scoliosis (737 30) (M41 9)    Past Medical History    1   History of Chromosomal imbalance syndrome, 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, deletion, 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)   2  History of Chromosomal imbalance syndrome, 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, deletion, 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)   (Q93 89)   3  History of Congenital hip dysplasia (755 63) (Q65 89)    Surgical History    1  History of Eye Surgery   2  History of Tonsillectomy With Adenoidectomy  Surgical History Reviewed: The surgical history was reviewed and updated today  Family History  Mother    1  Family history of osteoporosis (V17 81) (Z82 62)   2  No pertinent family history  Aunt    3  Family history of malignant neoplasm (V16 9) (Z80 9)  Family History Reviewed: The family history was reviewed and updated today  Social History    · Never a smoker   · Non smoker (V49 89) (Z78 9)  Social History Reviewed: The social history was reviewed and updated today  Current Meds   1  LORazepam 1 MG Oral Tablet; TAKE 0 5 TABLET Daily PRN; Therapy: (Recorded:05Oct2016) to Recorded   2  Omeprazole 40 MG Oral Capsule Delayed Release; TAKE 1 CAPSULE Daily; Therapy: (Recorded:05Oct2016) to Recorded   3  Paxil 10 MG/5ML Oral Suspension; TAKE 5 ML ONCE A DAY; Therapy: 34HET6491 to (Evaluate:39Eya4734)  Requested for: 42Bmy0171; Last   Rx:[id number] Ordered   4  Seasonale 0 15-0 03 MG TABS; Therapy: (Recorded:03Mar2016) to Recorded  Medication List Reviewed: The medication list was reviewed and updated today  Allergies    1  No Known Drug Allergies    Vitals  Signs   Recorded: 95CEA5978 43:88BJ   Systolic: 006  Diastolic: 70  Heart Rate: 114  Respiration: 16  Pain Scale: 0  Height: 5 ft 4 in  Weight: 121 lb   BMI Calculated: 20 77  BSA Calculated: 1 58    Physical Exam    Constitutional   General appearance: Abnormal   appears tired and well hydrated  Eyes   Conjunctiva and lids: Abnormal   Slight redundancy to bilateral upper eyelids  Ears, Nose, Mouth, and Throat   External inspection of ears and nose: Normal     Otoscopic examination: Abnormal   Slight cerumen bilaterally     Oropharynx: Normal with no erythema, edema, exudate or lesions  Pulmonary   Auscultation of lungs: Clear to auscultation  Cardiovascular   Auscultation of heart: Normal rate and rhythm, normal S1 and S2, without murmurs  Examination of extremities for edema and/or varicosities: Abnormal   bilateral ankle trace+ pitting edema and bilateral pretibial trace+ pitting edema  Abdomen   Abdomen: Abnormal   Normal bowel sounds throughout; no current complaints of tenderness  Lymphatic   Palpation of lymph nodes in neck: No lymphadenopathy  Musculoskeletal   Gait and station: Abnormal   Slow but steady  Skin   Skin and subcutaneous tissue: Abnormal   Slightly pale appearing  Neurologic   Reflexes: 2+ and symmetric  No tremors noted on exam today; no abnormal movements of extremities on exam    Psychiatric   Mood and affect: Abnormal   Anxious; uncomfortable appearing in the doctor's office  Future Appointments    Date/Time Provider Specialty Site   10/11/2016 11:00 AM ANDREWS Montalvo , [facility]   01/11/2017 02:00 PM ANDREWS Mckeon  [address] [place] [address]   10/10/2016 01:10 PM ANDREWS Clinton   Orthopedic Surgery [address]     Signatures   Electronically signed by : ANDREWS De Anda ; Oct 10 2016  4:38AM EST                       (Author)

## 2018-01-18 NOTE — RESULT NOTES
Verified Results  (1) CBC/PLT/DIFF [id number] 02:52PM Eder Kibee     Test Name Result Flag Reference   WBC COUNT 5 99 Thousand/uL  4 31-10 16   RBC COUNT 4 89 Million/uL  3 81-5 12   HEMOGLOBIN 9 7 g/dL L 11 5-15 4   HEMATOCRIT 33 8 % L 34 8-46  1   MCV 69 fL L 82-98   MCH 19 8 pg L 26 8-34 3   MCHC 28 7 g/dL L 31 4-37 4   RDW 17 8 % H 11 6-15 1   MPV 8 6 fL L 8 9-12 7   PLATELET COUNT 249 Thousands/uL H 149-390   nRBC AUTOMATED 0 /100 WBCs     NEUTROPHILS RELATIVE PERCENT 53 %  43-75   LYMPHOCYTES RELATIVE PERCENT 38 %  14-44   MONOCYTES RELATIVE PERCENT 8 %  4-12   EOSINOPHILS RELATIVE PERCENT 1 %  0-6   BASOPHILS RELATIVE PERCENT 0 %  0-1   NEUTROPHILS ABSOLUTE COUNT 3 18 Thousands/?L  1 85-7 62   LYMPHOCYTES ABSOLUTE COUNT 2 26 Thousands/?L  0 60-4 47   MONOCYTES ABSOLUTE COUNT 0 47 Thousand/?L  0 17-1 22   EOSINOPHILS ABSOLUTE COUNT 0 05 Thousand/?L  0 00-0 61   BASOPHILS ABSOLUTE COUNT 0 02 Thousands/?L  0 00-0 10     (1) FERRITIN [id number] 02:52PM [facility]     Test Name Result Flag Reference   FERRITIN 24 ng/mL  8-388     (1) TIBC [id number] 02:52PM [facility]     Test Name Result Flag Reference   TOTAL IRON BINDING CAPACITY 329 ug/dL  250-450     (1) COMPREHENSIVE METABOLIC PANEL [id number] 25:65YZ [facility]     Test Name Result Flag Reference   GLUCOSE,RANDM 83 mg/dL     If the patient is fasting, the ADA then defines impaired fasting glucose as > 100 mg/dL and diabetes as > or equal to 123 mg/dL     SODIUM 139 mmol/L  136-145   POTASSIUM 3 9 mmol/L  3 5-5 3   CHLORIDE 105 mmol/L  100-108   CARBON DIOXIDE 24 mmol/L  21-32   ANION GAP (CALC) 10 mmol/L  4-13   BLOOD UREA NITROGEN 8 mg/dL  5-25   CREATININE 0 47 mg/dL L 0 60-1 30   Standardized to IDMS reference method   CALCIUM 9 2 mg/dL  8 3-10 1   BILI, TOTAL 0 50 mg/dL  0 20-1 00   ALK PHOSPHATAS 241 U/L H    ALT (SGPT) 34 U/L  12-78   AST(SGOT) 18 U/L  5-45   ALBUMIN 2 7 g/dL L 3 5-5 0   TOTAL PROTEIN 8 1 g/dL 6  4-8 2   eGFR Non-African American      >60 0 ml/min/1 73sq m   [facility] Disease Education Program recommendations are as follows:  GFR calculation is accurate only with a steady state creatinine  Chronic Kidney disease less than 60 ml/min/1 73 sq  meters  Kidney failure less than 15 ml/min/1 73 sq  meters  (1) TSH [id number] 02:52PM Wilmon Lisseth     Test Name Result Flag Reference   TSH 1 670 uIU/mL  0 358-3 740   Patients undergoing fluorescein dye angiography may retain small amounts of fluorescein in the body for 48-72 hours post procedure  Samples containing fluorescein can produce falsely depressed TSH values  If the patient had this procedure,a specimen should be resubmitted post fluorescein clearance  The recommended reference ranges for TSH during pregnancy are as follows:  First trimester 0 1 to 2 5 uIU/mL  Second trimester  0 2 to 3 0 uIU/mL  Third trimester 0 3 to 3 0 uIU/m     (1) SED RATE [id number] 02:52PM Wilmon Lisseth     Test Name Result Flag Reference   SED RATE 82 mm/hour H 0-20     (1) IRON [id number] 02:52PM Wilmon Lisseth     Test Name Result Flag Reference   IRON 20 ug/dL L    Patients treated with metal-binding drugs (ie  Deferoxamine) may have depressed iron values

## 2018-01-23 NOTE — MISCELLANEOUS
Message  GI Reminder Recall [facility]:   Date: 12/15/2017   Dear Ann Ocampo:     Review of our records shows you are due for the following: Follow Up Visit  Please call the following office to schedule your appointment:   [address] [facility], [place] [address] [phone]  We look forward to hearing from you!      Sincerely,     [facility]      Signatures   Electronically signed by : Nidia Kohler, ; Dec 15 2017  9:37AM EST                       (Author)

## 2018-01-29 ENCOUNTER — OFFICE VISIT (OUTPATIENT)
Dept: INTERNAL MEDICINE CLINIC | Facility: CLINIC | Age: 23
End: 2018-01-29
Payer: COMMERCIAL

## 2018-01-29 VITALS
HEIGHT: 61 IN | BODY MASS INDEX: 27.94 KG/M2 | DIASTOLIC BLOOD PRESSURE: 80 MMHG | WEIGHT: 148 LBS | TEMPERATURE: 96.8 F | OXYGEN SATURATION: 96 % | SYSTOLIC BLOOD PRESSURE: 122 MMHG | HEART RATE: 108 BPM | RESPIRATION RATE: 18 BRPM

## 2018-01-29 DIAGNOSIS — F84.0 ACTIVE AUTISTIC DISORDER: Chronic | ICD-10-CM

## 2018-01-29 DIAGNOSIS — IMO0001: ICD-10-CM

## 2018-01-29 DIAGNOSIS — D63.8 ANEMIA OF CHRONIC DISEASE: Chronic | ICD-10-CM

## 2018-01-29 DIAGNOSIS — F41.1 GENERALIZED ANXIETY DISORDER: Primary | ICD-10-CM

## 2018-01-29 PROBLEM — M31.30 WEGENER'S GRANULOMATOSIS: Status: ACTIVE | Noted: 2017-05-01

## 2018-01-29 PROBLEM — M31.30 WEGENER'S GRANULOMATOSIS: Status: RESOLVED | Noted: 2017-05-01 | Resolved: 2018-01-29

## 2018-01-29 PROBLEM — T69.1XXA CHILBLAIN: Status: ACTIVE | Noted: 2017-04-05

## 2018-01-29 PROCEDURE — 99214 OFFICE O/P EST MOD 30 MIN: CPT | Performed by: FAMILY MEDICINE

## 2018-01-29 RX ORDER — PAROXETINE 10 MG/5ML
15 SUSPENSION ORAL EVERY MORNING
Qty: 1350 ML | Refills: 3 | Status: SHIPPED | OUTPATIENT
Start: 2018-01-29 | End: 2018-04-02 | Stop reason: SDUPTHER

## 2018-01-29 RX ORDER — PREDNISONE 1 MG/1
20 TABLET ORAL
COMMUNITY
Start: 2018-01-11

## 2018-01-29 NOTE — PROGRESS NOTES
Assessment/Plan:     Anemia of chronic disease  Continue follow-up with Rheumatology  Hemoglobin has supposedly been stable but no recent laboratory testing to support this  Advised her mother to get his reports of her most recent laboratory testing  [facility]omat[facility] )  Continue follow-up with Rheumatology  Continue with Rituxan as per their direction  Symptoms are currently stable  Continue to follow up with Ophthalmology on a regular basis  Continue to follow up with Nephrology  Continue routine laboratory testing  Generalized anxiety disorder  Her mother had already increased her dose to 15 mg of the liquid Celexa on a daily basis will continue this dosage since she seems to be doing better with this  Discussed with her and her mother possibly increasing to a dosage of 20 mg on a daily basis in the future if needed  Prescription sent for 90 days to the pharmacy for 1 and 0 5 tsp of 10 mg per 5 cc dosing  Diagnoses and all orders for this visit:    Generalized anxiety disorder  -     PARoxetine (PAXIL) 10 mg/5 mL suspension; Take 7 5 mL (15 mg total) by mouth every morning for 30 days    Active autistic disorder    Anemia of chronic disease    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 )          Subjective:      Patient ID: Jennifer Reaves is a 20 y o  female  She presents for routine follow-up  Has continue to follow up with Rheumatology on a regular basis  She received 2 doses of the Rituxan over the summer and just recently received 2 more  They had tried to wean her off of the prednisone but this was not successful  She began to have increased joint symptoms  Increased joint stiffness and swelling  They had since increased her dose of prednisone again  They are hopeful that after she completes the Rituxan therapy and recovery from it, that she will be able to taper down on the prednisone  Appetite has been generally stable  Denies any nausea, vomiting, or diarrhea    This has improved significantly on the Paxil  She does seem frustrated at times  Tries to stay well hydrated  Denies any headaches or localized weakness  Denies any chest pain or palpitations  She continues with significant limitations  Denies any urinary symptoms  Denies any vision symptoms  Follows up with Ophthalmology on a regular basis because of her current medications and with her history of significant ptosis in the past         The following portions of the patient's history were reviewed and updated as appropriate:   She  has a 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; Autism; Chromosome 1 deletion syndrome; and Congenital hip dysplasia  She  does not have any pertinent problems on file  She  has a past surgical history that includes Eye surgery; Blepharoptosis repair; Cyst Removal; TONSILECTOMY AND ADNOIDECTOMY; and Esophagogastroduodenoscopy (N/A, 10/3/2016)  Her family history includes Cancer in her paternal aunt and paternal aunt; Osteoporosis in her mother  She  reports that she has never smoked  She does not have any smokeless tobacco history on file  She reports that she does not drink alcohol or use drugs  Current Outpatient Prescriptions   Medication Sig Dispense Refill    levonorgestrel-ethinyl estradiol (SEASONALE) 0 15-0 03 MG per tablet Take 1 tablet by mouth daily   omeprazole (PriLOSEC) 40 MG capsule Take 1 capsule by mouth daily for 30 days 30 capsule 0    PARoxetine (PAXIL) 10 mg/5 mL suspension Take 7 5 mL (15 mg total) by mouth every morning for 30 days 1350 mL 3    predniSONE 5 mg tablet Take 20 mg by mouth       No current facility-administered medications for this visit  Current Outpatient Prescriptions on File Prior to Visit   Medication Sig    levonorgestrel-ethinyl estradiol (SEASONALE) 0 15-0 03 MG per tablet Take 1 tablet by mouth daily      omeprazole (PriLOSEC) 40 MG capsule Take 1 capsule by mouth daily for 30 days    [DISCONTINUED] LORazepam (ATIVAN) 1 mg tablet Take 0 5 tablets by mouth daily as needed for anxiety for up to 10 days    [DISCONTINUED] PARoxetine (PAXIL) 10 mg/5 mL suspension Take 10 mL by mouth every morning for 30 days     No current facility-administered medications on file prior to visit  She has No Known Allergies       Review of Systems      Objective:     Physical Exam   HENT:   Right Ear: External ear and ear canal normal    Left Ear: Tympanic membrane, external ear and ear canal normal    Nose: Mucosal edema (Purulent nasal discharge) and rhinorrhea present  Pulmonary/Chest: Effort normal and breath sounds normal  She has no decreased breath sounds  Psychiatric:   Quiet in defers most questions to her mother

## 2018-01-30 NOTE — ASSESSMENT & PLAN NOTE
Continue follow-up with Rheumatology  Hemoglobin has supposedly been stable but no recent laboratory testing to support this  Advised her mother to get his reports of her most recent laboratory testing

## 2018-01-30 NOTE — ASSESSMENT & PLAN NOTE
Continue follow-up with Rheumatology  Continue with Rituxan as per their direction  Symptoms are currently stable  Continue to follow up with Ophthalmology on a regular basis  Continue to follow up with Nephrology  Continue routine laboratory testing

## 2018-01-30 NOTE — ASSESSMENT & PLAN NOTE
Her mother had already increased her dose to 15 mg of the liquid Celexa on a daily basis will continue this dosage since she seems to be doing better with this  Discussed with her and her mother possibly increasing to a dosage of 20 mg on a daily basis in the future if needed  Prescription sent for 90 days to the pharmacy for 1 and 0 5 tsp of 10 mg per 5 cc dosing

## 2018-02-01 DIAGNOSIS — R26.2 AMBULATORY DYSFUNCTION: ICD-10-CM

## 2018-02-01 DIAGNOSIS — IMO0001: Primary | ICD-10-CM

## 2018-02-20 ENCOUNTER — OFFICE VISIT (OUTPATIENT)
Dept: GASTROENTEROLOGY | Facility: HOSPITAL | Age: 23
End: 2018-02-20
Payer: COMMERCIAL

## 2018-02-20 VITALS
HEART RATE: 68 BPM | SYSTOLIC BLOOD PRESSURE: 106 MMHG | WEIGHT: 150 LBS | TEMPERATURE: 97.8 F | HEIGHT: 61 IN | BODY MASS INDEX: 28.32 KG/M2 | DIASTOLIC BLOOD PRESSURE: 60 MMHG

## 2018-02-20 DIAGNOSIS — D50.8 OTHER IRON DEFICIENCY ANEMIA: Primary | ICD-10-CM

## 2018-02-20 DIAGNOSIS — K21.9 GASTROESOPHAGEAL REFLUX DISEASE WITHOUT ESOPHAGITIS: ICD-10-CM

## 2018-02-20 PROCEDURE — 99214 OFFICE O/P EST MOD 30 MIN: CPT | Performed by: INTERNAL MEDICINE

## 2018-02-20 RX ORDER — OMEPRAZOLE 40 MG/1
40 CAPSULE, DELAYED RELEASE ORAL DAILY
Qty: 90 CAPSULE | Refills: 3 | Status: SHIPPED | OUTPATIENT
Start: 2018-02-20 | End: 2019-03-12 | Stop reason: SDUPTHER

## 2018-02-20 NOTE — PROGRESS NOTES
[facility] - Outpatient Follow-up Note  Armando Garcia 20 y o  female MRN: [id number]  Encounter: [id number]          ASSESSMENT AND PLAN:    20year old here for follow up  1  GERD- can continue PPI once nightly     2  Iron def anemia-   Just had labs at [facility] and HB is now 15, no need for repeat labs today  Small bowel biopsies were negative for Celiac disease     Follow up on an annual basis  ______________________________________________________________________    SUBJECTIVE:  Pt here for follow up  Is seeing rheumatologist at [facility] and was diagnosed with Wegener's  Has been on rituxan and pred 20  Has gained some weight while on the pred  No further vomiting or indigestion  She is taking prilosec 40 mg at 8 pm at night  REVIEW OF SYSTEMS IS OTHERWISE NEGATIVE  Historical Information   Past Medical History:   Diagnosis Date    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     Autism     Chromosome 1 deletion syndrome     Congenital hip dysplasia     last assessed: 3/10/2016     Past Surgical History:   Procedure Laterality Date    BELPHAROPTOSIS REPAIR      CYST REMOVAL      ESOPHAGOGASTRODUODENOSCOPY N/A 10/3/2016    Procedure: ESOPHAGOGASTRODUODENOSCOPY (EGD); Surgeon: Alciia Johnson DO;  Location: [facility];   Service:     EYE SURGERY      TONSILECTOMY AND ADNOIDECTOMY       Social History   History   Alcohol Use No     History   Drug Use No     History   Smoking Status    Never Smoker   Smokeless Tobacco    Never Used     Family History   Problem Relation Age of Onset    Osteoporosis Mother     Cancer Paternal Aunt      malignant neoplasm    Cancer Paternal Aunt      malignant neoplasm       Meds/Allergies       Current Outpatient Prescriptions:     levonorgestrel-ethinyl estradiol (SEASONALE) 0 15-0 03 MG per tablet    PARoxetine (PAXIL) 10 mg/5 mL suspension    predniSONE 5 mg tablet    omeprazole (PriLOSEC) 40 MG capsule    No Known Allergies        Objective     Blood pressure 106/60, pulse 68, temperature 97 8 °F (36 6 °C), temperature source Tympanic, height 5' 1" (1 549 m), weight 68 kg (150 lb)  PHYSICAL EXAM:      General Appearance:   Alert, cooperative, no distress   HEENT:   Normocephalic, atraumatic, anicteric      Neck:  Supple, symmetrical, trachea midline   Lungs:   Clear to auscultation bilaterally; no rales, rhonchi or wheezing; respirations unlabored    Heart[de-identified]   Regular rate and rhythm; no murmur, rub, or gallop  Abdomen:   Soft, non-tender, non-distended; normal bowel sounds; no masses, no organomegaly    Genitalia:   Deferred    Rectal:   Deferred    Extremities:  No cyanosis, clubbing or edema    Pulses:  2+ and symmetric    Skin:  No jaundice, rashes, or lesions    Lymph nodes:  No palpable cervical lymphadenopathy        Lab Results:   No visits with results within 1 Day(s) from this visit     Latest known visit with results is:   Appointment on 05/09/2017   Component Date Value    WBC 05/09/2017 12 28*    RBC 05/09/2017 5 97*    Hemoglobin 05/09/2017 14 9     Hematocrit 05/09/2017 48 3*    MCV 05/09/2017 81*    MCH 05/09/2017 25 0*    MCHC 05/09/2017 30 8*    RDW 05/09/2017 22 5*    MPV 05/09/2017 9 3     Platelets 62/68/6462 216     nRBC 05/09/2017 0     Neutrophils Relative 05/09/2017 82*    Lymphocytes Relative 05/09/2017 16     Monocytes Relative 05/09/2017 2*    Eosinophils Relative 05/09/2017 0     Basophils Relative 05/09/2017 0     Neutrophils Absolute 05/09/2017 9 93*    Lymphocytes Absolute 05/09/2017 2 00     Monocytes Absolute 05/09/2017 0 25     Eosinophils Absolute 05/09/2017 0 01     Basophils Absolute 05/09/2017 0 03     Sodium 05/09/2017 140     Potassium 05/09/2017 4 0     Chloride 05/09/2017 108     CO2 05/09/2017 25     Anion Gap 05/09/2017 7     BUN 05/09/2017 12     Creatinine 05/09/2017 0 61     Glucose, Fasting 05/09/2017 122*    Calcium 05/09/2017 8 6     AST 05/09/2017 6     ALT 05/09/2017 33  Alkaline Phosphatase 05/09/2017 118*    Total Protein 05/09/2017 6 9     Albumin 05/09/2017 3 6     Total Bilirubin 05/09/2017 0 27     eGFR 05/09/2017 >60 0     Sed Rate 05/09/2017 20     IGA 05/09/2017 159 0     IGG 05/09/2017 832 0     IGM 05/09/2017 165 0     QuantiFERON-TB Gold In T* 05/09/2017 Specimen incubated at [address], [place]      TSH 3RD [facility] 05/09/2017 0 921     Ferritin 05/09/2017 68     QuantiFERON TB Gold 05/09/2017 Negative     QuantiFERON Criteria 05/09/2017 Comment     QuantiFERON TB Ag Value 05/09/2017 0 03     QuantiFERON Nil Value 05/09/2017 0 04     QuantiFERON Mitogen value 05/09/2017 6 21     QFT TB Ag minus Nil Value 05/09/2017 <0 01     QFT Interpretation 05/09/2017 Comment          Radiology Results:   No results found

## 2018-02-20 NOTE — LETTER
February 22, 2018     Bola King, [facility]    Patient: Gauri Bell   YOB: 1995   Date of Visit: 2/20/2018       Dear Dr Jeanette Corbin:    Thank you for referring Gauri Bell to me for evaluation  Below are my notes for this consultation  If you have questions, please do not hesitate to call me  I look forward to following your patient along with you           Sincerely,        Srikanth Lopez, DO        CC: No Recipients

## 2018-03-12 ENCOUNTER — TELEPHONE (OUTPATIENT)
Dept: PHYSICAL THERAPY | Facility: CLINIC | Age: 23
End: 2018-03-12

## 2018-03-12 NOTE — TELEPHONE ENCOUNTER
Called at [employer] request  Mom (Johanny Stark) reports that patient is overall doing well  She will not be returning to PT at this time as the family is in the process of relocating to be closer to family  Therapist offered support if needed regarding transition of PT care, Mom appreciative

## 2018-04-02 DIAGNOSIS — F41.1 GENERALIZED ANXIETY DISORDER: ICD-10-CM

## 2018-04-02 RX ORDER — PAROXETINE 10 MG/5ML
15 SUSPENSION ORAL EVERY MORNING
Qty: 1350 ML | Refills: 2 | Status: SHIPPED | OUTPATIENT
Start: 2018-04-02 | End: 2018-04-03 | Stop reason: SDUPTHER

## 2018-04-03 DIAGNOSIS — F41.1 GENERALIZED ANXIETY DISORDER: ICD-10-CM

## 2018-04-03 RX ORDER — PAROXETINE 10 MG/5ML
SUSPENSION ORAL
Qty: 1350 ML | Refills: 3 | OUTPATIENT
Start: 2018-04-03

## 2018-07-25 ENCOUNTER — TELEPHONE (OUTPATIENT)
Dept: INTERNAL MEDICINE CLINIC | Facility: CLINIC | Age: 23
End: 2018-07-25

## 2018-07-25 NOTE — TELEPHONE ENCOUNTER
Mother of patient called stating that she needs a referral for patient to see endocrinologist   She gave me the name and phone number - Dr Tess Guerra/Noelle [phone] and said that she has an appointment with them  I called Dr Natalia Ching office and they stated that there is no appointment and that Dr Jayne Alvarenga does not see any one over the age of 25 years  I called and spoke to patient's Mother and told her this  Mother said that "they" said that they would give her special consideration  When I asked her who she spoke to she then stated that she's working on it and will call us back

## 2018-07-26 NOTE — TELEPHONE ENCOUNTER
I called and spoke to Mother of patient asked if she did get an appointment with the doctor  Mother stated that she is waiting for a call back from Dr Gwendolyn Bosch office  She stated that even though he just sees pediatric patients that he may still see her regarding her condition  I told her to let us know when she gets the appointment or if she needs anything from our office  Mother stated no that she will be in to sign a record release form because they are relocating and going to a different doctor

## 2019-03-12 DIAGNOSIS — K21.9 GASTROESOPHAGEAL REFLUX DISEASE WITHOUT ESOPHAGITIS: ICD-10-CM

## 2019-03-12 RX ORDER — OMEPRAZOLE 40 MG/1
40 CAPSULE, DELAYED RELEASE ORAL DAILY
Qty: 90 CAPSULE | Refills: 1 | Status: SHIPPED | OUTPATIENT
Start: 2019-03-12